# Patient Record
Sex: FEMALE | Race: WHITE | Employment: OTHER | URBAN - METROPOLITAN AREA
[De-identification: names, ages, dates, MRNs, and addresses within clinical notes are randomized per-mention and may not be internally consistent; named-entity substitution may affect disease eponyms.]

---

## 2017-04-23 PROBLEM — S22.42XA CLOSED FRACTURE OF MULTIPLE RIBS OF LEFT SIDE: Status: ACTIVE | Noted: 2017-04-23

## 2017-04-23 PROBLEM — S22.080A CLOSED WEDGE COMPRESSION FRACTURE OF TWELFTH THORACIC VERTEBRA (HCC): Status: ACTIVE | Noted: 2017-04-23

## 2017-04-23 PROBLEM — S27.0XXA TRAUMATIC PNEUMOTHORAX: Status: ACTIVE | Noted: 2017-04-23

## 2017-04-25 PROBLEM — S06.0X0A CONCUSSION WITHOUT LOSS OF CONSCIOUSNESS: Status: ACTIVE | Noted: 2017-04-25

## 2019-04-03 RX ORDER — SODIUM CHLORIDE 0.9 % (FLUSH) 0.9 %
10 SYRINGE (ML) INJECTION PRN
Status: CANCELLED | OUTPATIENT
Start: 2019-04-04

## 2019-04-03 RX ORDER — HEPARIN SODIUM (PORCINE) LOCK FLUSH IV SOLN 100 UNIT/ML 100 UNIT/ML
500 SOLUTION INTRAVENOUS PRN
Status: CANCELLED | OUTPATIENT
Start: 2019-04-04

## 2021-01-18 RX ORDER — HEPARIN SODIUM (PORCINE) LOCK FLUSH IV SOLN 100 UNIT/ML 100 UNIT/ML
500 SOLUTION INTRAVENOUS PRN
Status: CANCELLED | OUTPATIENT
Start: 2021-01-25

## 2021-01-18 RX ORDER — SODIUM CHLORIDE 9 MG/ML
INJECTION, SOLUTION INTRAVENOUS CONTINUOUS
Status: CANCELLED | OUTPATIENT
Start: 2021-01-25

## 2021-01-18 RX ORDER — ACETAMINOPHEN 325 MG/1
650 TABLET ORAL ONCE
Status: CANCELLED | OUTPATIENT
Start: 2021-01-25

## 2021-01-18 RX ORDER — DIPHENHYDRAMINE HYDROCHLORIDE 50 MG/ML
50 INJECTION INTRAMUSCULAR; INTRAVENOUS ONCE
Status: CANCELLED | OUTPATIENT
Start: 2021-01-25

## 2021-01-18 RX ORDER — SODIUM CHLORIDE 0.9 % (FLUSH) 0.9 %
10 SYRINGE (ML) INJECTION PRN
Status: CANCELLED | OUTPATIENT
Start: 2021-01-25

## 2021-01-25 ENCOUNTER — TELEPHONE (OUTPATIENT)
Dept: PHARMACY | Age: 61
End: 2021-01-25

## 2021-02-12 ENCOUNTER — HOSPITAL ENCOUNTER (OUTPATIENT)
Dept: INFUSION THERAPY | Age: 61
Setting detail: INFUSION SERIES
Discharge: HOME | End: 2021-02-12
Payer: MEDICARE

## 2021-02-12 VITALS
RESPIRATION RATE: 16 BRPM | HEART RATE: 78 BPM | OXYGEN SATURATION: 97 % | TEMPERATURE: 98 F | DIASTOLIC BLOOD PRESSURE: 96 MMHG | SYSTOLIC BLOOD PRESSURE: 167 MMHG

## 2021-02-12 DIAGNOSIS — G35 MULTIPLE SCLEROSIS (HCC): Primary | ICD-10-CM

## 2021-02-12 PROCEDURE — 2580000003 HC RX 258: Performed by: PSYCHIATRY & NEUROLOGY

## 2021-02-12 PROCEDURE — 6360000002 HC RX W HCPCS: Performed by: PSYCHIATRY & NEUROLOGY

## 2021-02-12 PROCEDURE — 96375 TX/PRO/DX INJ NEW DRUG ADDON: CPT

## 2021-02-12 PROCEDURE — 96365 THER/PROPH/DIAG IV INF INIT: CPT

## 2021-02-12 PROCEDURE — 6370000000 HC RX 637 (ALT 250 FOR IP): Performed by: PSYCHIATRY & NEUROLOGY

## 2021-02-12 PROCEDURE — 96361 HYDRATE IV INFUSION ADD-ON: CPT

## 2021-02-12 PROCEDURE — 96366 THER/PROPH/DIAG IV INF ADDON: CPT

## 2021-02-12 RX ORDER — SODIUM CHLORIDE 9 MG/ML
INJECTION, SOLUTION INTRAVENOUS CONTINUOUS
Status: CANCELLED | OUTPATIENT
Start: 2021-02-26

## 2021-02-12 RX ORDER — SODIUM CHLORIDE 9 MG/ML
INJECTION, SOLUTION INTRAVENOUS CONTINUOUS
Status: ACTIVE | OUTPATIENT
Start: 2021-02-12 | End: 2021-02-12

## 2021-02-12 RX ORDER — DIPHENHYDRAMINE HYDROCHLORIDE 50 MG/ML
50 INJECTION INTRAMUSCULAR; INTRAVENOUS ONCE
Status: COMPLETED | OUTPATIENT
Start: 2021-02-12 | End: 2021-02-12

## 2021-02-12 RX ORDER — SODIUM CHLORIDE 0.9 % (FLUSH) 0.9 %
10 SYRINGE (ML) INJECTION PRN
Status: CANCELLED | OUTPATIENT
Start: 2021-02-26

## 2021-02-12 RX ORDER — DIPHENHYDRAMINE HYDROCHLORIDE 50 MG/ML
50 INJECTION INTRAMUSCULAR; INTRAVENOUS ONCE
Status: CANCELLED | OUTPATIENT
Start: 2021-02-26

## 2021-02-12 RX ORDER — SODIUM CHLORIDE 0.9 % (FLUSH) 0.9 %
10 SYRINGE (ML) INJECTION PRN
Status: DISCONTINUED | OUTPATIENT
Start: 2021-02-12 | End: 2021-02-13 | Stop reason: HOSPADM

## 2021-02-12 RX ORDER — ACETAMINOPHEN 325 MG/1
650 TABLET ORAL ONCE
Status: COMPLETED | OUTPATIENT
Start: 2021-02-12 | End: 2021-02-12

## 2021-02-12 RX ORDER — ACETAMINOPHEN 325 MG/1
650 TABLET ORAL ONCE
Status: CANCELLED | OUTPATIENT
Start: 2021-02-26

## 2021-02-12 RX ORDER — HEPARIN SODIUM (PORCINE) LOCK FLUSH IV SOLN 100 UNIT/ML 100 UNIT/ML
500 SOLUTION INTRAVENOUS PRN
Status: CANCELLED | OUTPATIENT
Start: 2021-02-26

## 2021-02-12 RX ADMIN — HYDROCORTISONE SODIUM SUCCINATE 100 MG: 100 INJECTION, POWDER, FOR SOLUTION INTRAMUSCULAR; INTRAVENOUS at 09:41

## 2021-02-12 RX ADMIN — Medication 10 ML: at 09:24

## 2021-02-12 RX ADMIN — Medication 10 ML: at 09:46

## 2021-02-12 RX ADMIN — SODIUM CHLORIDE: 9 INJECTION, SOLUTION INTRAVENOUS at 09:25

## 2021-02-12 RX ADMIN — Medication 10 ML: at 09:42

## 2021-02-12 RX ADMIN — ACETAMINOPHEN 650 MG: 325 TABLET, FILM COATED ORAL at 09:39

## 2021-02-12 RX ADMIN — Medication 10 ML: at 14:00

## 2021-02-12 RX ADMIN — DIPHENHYDRAMINE HYDROCHLORIDE 50 MG: 50 INJECTION, SOLUTION INTRAMUSCULAR; INTRAVENOUS at 09:42

## 2021-02-12 RX ADMIN — Medication 10 ML: at 09:41

## 2021-02-12 ASSESSMENT — PAIN SCALES - GENERAL: PAINLEVEL_OUTOF10: 0

## 2021-02-26 ENCOUNTER — HOSPITAL ENCOUNTER (OUTPATIENT)
Dept: INFUSION THERAPY | Age: 61
Setting detail: INFUSION SERIES
Discharge: HOME | End: 2021-02-26
Payer: MEDICARE

## 2021-02-26 VITALS
RESPIRATION RATE: 16 BRPM | SYSTOLIC BLOOD PRESSURE: 155 MMHG | TEMPERATURE: 98 F | DIASTOLIC BLOOD PRESSURE: 70 MMHG | HEART RATE: 78 BPM | OXYGEN SATURATION: 97 %

## 2021-02-26 DIAGNOSIS — G35 MULTIPLE SCLEROSIS (HCC): Primary | ICD-10-CM

## 2021-02-26 PROCEDURE — 96361 HYDRATE IV INFUSION ADD-ON: CPT

## 2021-02-26 PROCEDURE — 6360000002 HC RX W HCPCS: Performed by: PSYCHIATRY & NEUROLOGY

## 2021-02-26 PROCEDURE — 6370000000 HC RX 637 (ALT 250 FOR IP): Performed by: PSYCHIATRY & NEUROLOGY

## 2021-02-26 PROCEDURE — 96366 THER/PROPH/DIAG IV INF ADDON: CPT

## 2021-02-26 PROCEDURE — 96365 THER/PROPH/DIAG IV INF INIT: CPT

## 2021-02-26 PROCEDURE — 96375 TX/PRO/DX INJ NEW DRUG ADDON: CPT

## 2021-02-26 PROCEDURE — 2580000003 HC RX 258: Performed by: PSYCHIATRY & NEUROLOGY

## 2021-02-26 RX ORDER — HEPARIN SODIUM (PORCINE) LOCK FLUSH IV SOLN 100 UNIT/ML 100 UNIT/ML
500 SOLUTION INTRAVENOUS PRN
Status: CANCELLED | OUTPATIENT
Start: 2021-02-26

## 2021-02-26 RX ORDER — ACETAMINOPHEN 325 MG/1
650 TABLET ORAL ONCE
Status: CANCELLED | OUTPATIENT
Start: 2021-02-26

## 2021-02-26 RX ORDER — DIPHENHYDRAMINE HYDROCHLORIDE 50 MG/ML
50 INJECTION INTRAMUSCULAR; INTRAVENOUS ONCE
Status: COMPLETED | OUTPATIENT
Start: 2021-02-26 | End: 2021-02-26

## 2021-02-26 RX ORDER — ACETAMINOPHEN 325 MG/1
650 TABLET ORAL ONCE
Status: COMPLETED | OUTPATIENT
Start: 2021-02-26 | End: 2021-02-26

## 2021-02-26 RX ORDER — LOSARTAN POTASSIUM 50 MG/1
50 TABLET ORAL DAILY
COMMUNITY

## 2021-02-26 RX ORDER — DIPHENHYDRAMINE HYDROCHLORIDE 50 MG/ML
50 INJECTION INTRAMUSCULAR; INTRAVENOUS ONCE
Status: CANCELLED | OUTPATIENT
Start: 2021-02-26

## 2021-02-26 RX ORDER — SODIUM CHLORIDE 9 MG/ML
INJECTION, SOLUTION INTRAVENOUS CONTINUOUS
Status: ACTIVE | OUTPATIENT
Start: 2021-02-26 | End: 2021-02-26

## 2021-02-26 RX ORDER — LEVOTHYROXINE SODIUM 0.05 MG/1
50 TABLET ORAL DAILY
COMMUNITY

## 2021-02-26 RX ORDER — SODIUM CHLORIDE 0.9 % (FLUSH) 0.9 %
10 SYRINGE (ML) INJECTION PRN
Status: CANCELLED | OUTPATIENT
Start: 2021-02-26

## 2021-02-26 RX ORDER — BACLOFEN 10 MG/1
10 TABLET ORAL 3 TIMES DAILY
COMMUNITY

## 2021-02-26 RX ORDER — SODIUM CHLORIDE 9 MG/ML
INJECTION, SOLUTION INTRAVENOUS CONTINUOUS
Status: CANCELLED | OUTPATIENT
Start: 2021-02-26

## 2021-02-26 RX ORDER — MULTIVITAMIN WITH IRON
100 TABLET ORAL DAILY
COMMUNITY

## 2021-02-26 RX ORDER — SODIUM CHLORIDE 0.9 % (FLUSH) 0.9 %
10 SYRINGE (ML) INJECTION PRN
Status: DISCONTINUED | OUTPATIENT
Start: 2021-02-26 | End: 2021-02-27 | Stop reason: HOSPADM

## 2021-02-26 RX ADMIN — HYDROCORTISONE SODIUM SUCCINATE 100 MG: 100 INJECTION, POWDER, FOR SOLUTION INTRAMUSCULAR; INTRAVENOUS at 09:09

## 2021-02-26 RX ADMIN — SODIUM CHLORIDE, PRESERVATIVE FREE 10 ML: 5 INJECTION INTRAVENOUS at 09:09

## 2021-02-26 RX ADMIN — SODIUM CHLORIDE: 9 INJECTION, SOLUTION INTRAVENOUS at 09:06

## 2021-02-26 RX ADMIN — ACETAMINOPHEN 650 MG: 325 TABLET, FILM COATED ORAL at 09:07

## 2021-02-26 RX ADMIN — SODIUM CHLORIDE, PRESERVATIVE FREE 10 ML: 5 INJECTION INTRAVENOUS at 09:10

## 2021-02-26 RX ADMIN — SODIUM CHLORIDE, PRESERVATIVE FREE 10 ML: 5 INJECTION INTRAVENOUS at 12:33

## 2021-02-26 RX ADMIN — SODIUM CHLORIDE, PRESERVATIVE FREE 10 ML: 5 INJECTION INTRAVENOUS at 09:06

## 2021-02-26 RX ADMIN — DIPHENHYDRAMINE HYDROCHLORIDE 50 MG: 50 INJECTION, SOLUTION INTRAMUSCULAR; INTRAVENOUS at 09:08

## 2021-02-26 ASSESSMENT — PAIN SCALES - GENERAL: PAINLEVEL_OUTOF10: 0

## 2021-08-02 ENCOUNTER — TELEPHONE (OUTPATIENT)
Dept: INFUSION THERAPY | Age: 61
End: 2021-08-02

## 2021-08-04 RX ORDER — EPINEPHRINE 1 MG/ML
0.3 INJECTION, SOLUTION, CONCENTRATE INTRAVENOUS PRN
Status: CANCELLED | OUTPATIENT
Start: 2021-08-05

## 2021-08-04 RX ORDER — SODIUM CHLORIDE 9 MG/ML
INJECTION, SOLUTION INTRAVENOUS CONTINUOUS
Status: CANCELLED | OUTPATIENT
Start: 2021-08-05

## 2021-08-04 RX ORDER — SODIUM CHLORIDE 0.9 % (FLUSH) 0.9 %
5-40 SYRINGE (ML) INJECTION PRN
Status: CANCELLED | OUTPATIENT
Start: 2021-08-05

## 2021-08-04 RX ORDER — ACETAMINOPHEN 325 MG/1
650 TABLET ORAL ONCE
Status: CANCELLED | OUTPATIENT
Start: 2021-08-05

## 2021-08-04 RX ORDER — METHYLPREDNISOLONE SODIUM SUCCINATE 125 MG/2ML
100 INJECTION, POWDER, LYOPHILIZED, FOR SOLUTION INTRAMUSCULAR; INTRAVENOUS ONCE
Status: CANCELLED | OUTPATIENT
Start: 2021-08-05

## 2021-08-04 RX ORDER — METHYLPREDNISOLONE SODIUM SUCCINATE 125 MG/2ML
125 INJECTION, POWDER, LYOPHILIZED, FOR SOLUTION INTRAMUSCULAR; INTRAVENOUS ONCE
Status: CANCELLED | OUTPATIENT
Start: 2021-08-05 | End: 2021-08-05

## 2021-08-04 RX ORDER — DIPHENHYDRAMINE HYDROCHLORIDE 50 MG/ML
50 INJECTION INTRAMUSCULAR; INTRAVENOUS ONCE
Status: CANCELLED | OUTPATIENT
Start: 2021-08-05 | End: 2021-08-05

## 2021-08-04 RX ORDER — HEPARIN SODIUM (PORCINE) LOCK FLUSH IV SOLN 100 UNIT/ML 100 UNIT/ML
500 SOLUTION INTRAVENOUS PRN
Status: CANCELLED | OUTPATIENT
Start: 2021-08-05

## 2021-08-26 ENCOUNTER — HOSPITAL ENCOUNTER (OUTPATIENT)
Dept: INFUSION THERAPY | Age: 61
Setting detail: INFUSION SERIES
Discharge: HOME | End: 2021-08-26
Payer: MEDICARE

## 2021-08-26 VITALS
SYSTOLIC BLOOD PRESSURE: 160 MMHG | DIASTOLIC BLOOD PRESSURE: 67 MMHG | OXYGEN SATURATION: 98 % | TEMPERATURE: 97.4 F | HEART RATE: 62 BPM | RESPIRATION RATE: 22 BRPM

## 2021-08-26 DIAGNOSIS — G35 MULTIPLE SCLEROSIS (HCC): Primary | ICD-10-CM

## 2021-08-26 PROCEDURE — 96365 THER/PROPH/DIAG IV INF INIT: CPT

## 2021-08-26 PROCEDURE — 96375 TX/PRO/DX INJ NEW DRUG ADDON: CPT

## 2021-08-26 PROCEDURE — 6370000000 HC RX 637 (ALT 250 FOR IP): Performed by: PSYCHIATRY & NEUROLOGY

## 2021-08-26 PROCEDURE — 2580000003 HC RX 258: Performed by: PSYCHIATRY & NEUROLOGY

## 2021-08-26 PROCEDURE — 96366 THER/PROPH/DIAG IV INF ADDON: CPT

## 2021-08-26 PROCEDURE — 96361 HYDRATE IV INFUSION ADD-ON: CPT

## 2021-08-26 PROCEDURE — 96367 TX/PROPH/DG ADDL SEQ IV INF: CPT

## 2021-08-26 PROCEDURE — 6360000002 HC RX W HCPCS: Performed by: PSYCHIATRY & NEUROLOGY

## 2021-08-26 RX ORDER — METHYLPREDNISOLONE SODIUM SUCCINATE 125 MG/2ML
100 INJECTION, POWDER, LYOPHILIZED, FOR SOLUTION INTRAMUSCULAR; INTRAVENOUS ONCE
Status: COMPLETED | OUTPATIENT
Start: 2021-08-26 | End: 2021-08-26

## 2021-08-26 RX ORDER — SODIUM CHLORIDE 9 MG/ML
INJECTION, SOLUTION INTRAVENOUS CONTINUOUS
Status: CANCELLED | OUTPATIENT
Start: 2022-02-10

## 2021-08-26 RX ORDER — DIPHENHYDRAMINE HYDROCHLORIDE 50 MG/ML
50 INJECTION INTRAMUSCULAR; INTRAVENOUS ONCE
Status: CANCELLED | OUTPATIENT
Start: 2022-02-10 | End: 2022-02-10

## 2021-08-26 RX ORDER — SODIUM CHLORIDE 0.9 % (FLUSH) 0.9 %
5-40 SYRINGE (ML) INJECTION PRN
Status: CANCELLED | OUTPATIENT
Start: 2022-02-10

## 2021-08-26 RX ORDER — ACETAMINOPHEN 325 MG/1
650 TABLET ORAL ONCE
Status: COMPLETED | OUTPATIENT
Start: 2021-08-26 | End: 2021-08-26

## 2021-08-26 RX ORDER — ACETAMINOPHEN 325 MG/1
650 TABLET ORAL ONCE
Status: CANCELLED | OUTPATIENT
Start: 2022-02-10

## 2021-08-26 RX ORDER — SODIUM CHLORIDE 9 MG/ML
INJECTION, SOLUTION INTRAVENOUS CONTINUOUS
Status: ACTIVE | OUTPATIENT
Start: 2021-08-26 | End: 2021-08-26

## 2021-08-26 RX ORDER — HEPARIN SODIUM (PORCINE) LOCK FLUSH IV SOLN 100 UNIT/ML 100 UNIT/ML
500 SOLUTION INTRAVENOUS PRN
Status: CANCELLED | OUTPATIENT
Start: 2022-02-10

## 2021-08-26 RX ORDER — SODIUM CHLORIDE 0.9 % (FLUSH) 0.9 %
5-40 SYRINGE (ML) INJECTION PRN
Status: DISCONTINUED | OUTPATIENT
Start: 2021-08-26 | End: 2021-08-27 | Stop reason: HOSPADM

## 2021-08-26 RX ORDER — METHYLPREDNISOLONE SODIUM SUCCINATE 125 MG/2ML
100 INJECTION, POWDER, LYOPHILIZED, FOR SOLUTION INTRAMUSCULAR; INTRAVENOUS ONCE
Status: CANCELLED | OUTPATIENT
Start: 2022-02-10

## 2021-08-26 RX ORDER — EPINEPHRINE 1 MG/ML
0.3 INJECTION, SOLUTION, CONCENTRATE INTRAVENOUS PRN
Status: CANCELLED | OUTPATIENT
Start: 2022-02-10

## 2021-08-26 RX ORDER — METHYLPREDNISOLONE SODIUM SUCCINATE 125 MG/2ML
125 INJECTION, POWDER, LYOPHILIZED, FOR SOLUTION INTRAMUSCULAR; INTRAVENOUS ONCE
Status: CANCELLED | OUTPATIENT
Start: 2022-02-10 | End: 2022-02-10

## 2021-08-26 RX ADMIN — METHYLPREDNISOLONE SODIUM SUCCINATE 100 MG: 125 INJECTION, POWDER, FOR SOLUTION INTRAMUSCULAR; INTRAVENOUS at 09:22

## 2021-08-26 RX ADMIN — DIPHENHYDRAMINE HYDROCHLORIDE 50 MG: 50 INJECTION, SOLUTION INTRAMUSCULAR; INTRAVENOUS at 09:27

## 2021-08-26 RX ADMIN — SODIUM CHLORIDE: 9 INJECTION, SOLUTION INTRAVENOUS at 09:24

## 2021-08-26 RX ADMIN — ACETAMINOPHEN 650 MG: 325 TABLET ORAL at 09:22

## 2021-08-26 ASSESSMENT — PAIN SCALES - GENERAL: PAINLEVEL_OUTOF10: 0

## 2022-01-20 RX ORDER — SODIUM CHLORIDE 0.9 % (FLUSH) 0.9 %
5-40 SYRINGE (ML) INJECTION PRN
Status: CANCELLED | OUTPATIENT
Start: 2022-01-21

## 2022-01-20 RX ORDER — HEPARIN SODIUM (PORCINE) LOCK FLUSH IV SOLN 100 UNIT/ML 100 UNIT/ML
500 SOLUTION INTRAVENOUS PRN
Status: CANCELLED | OUTPATIENT
Start: 2022-01-21

## 2022-01-20 RX ORDER — METHYLPREDNISOLONE SODIUM SUCCINATE 125 MG/2ML
100 INJECTION, POWDER, LYOPHILIZED, FOR SOLUTION INTRAMUSCULAR; INTRAVENOUS ONCE
Status: CANCELLED | OUTPATIENT
Start: 2022-01-21

## 2022-01-20 RX ORDER — SODIUM CHLORIDE 9 MG/ML
25 INJECTION, SOLUTION INTRAVENOUS PRN
Status: CANCELLED | OUTPATIENT
Start: 2022-01-21

## 2022-01-20 RX ORDER — ACETAMINOPHEN 325 MG/1
650 TABLET ORAL ONCE
Status: CANCELLED | OUTPATIENT
Start: 2022-01-21

## 2022-01-20 RX ORDER — EPINEPHRINE 1 MG/ML
0.3 INJECTION, SOLUTION, CONCENTRATE INTRAVENOUS PRN
Status: CANCELLED | OUTPATIENT
Start: 2022-01-21

## 2022-01-20 RX ORDER — SODIUM CHLORIDE 9 MG/ML
INJECTION, SOLUTION INTRAVENOUS CONTINUOUS
Status: CANCELLED | OUTPATIENT
Start: 2022-01-21

## 2022-01-20 RX ORDER — ALBUTEROL SULFATE 90 UG/1
4 AEROSOL, METERED RESPIRATORY (INHALATION) PRN
Status: CANCELLED | OUTPATIENT
Start: 2022-01-21

## 2022-01-20 RX ORDER — ONDANSETRON 2 MG/ML
8 INJECTION INTRAMUSCULAR; INTRAVENOUS
Status: CANCELLED | OUTPATIENT
Start: 2022-01-21

## 2022-01-20 RX ORDER — DIPHENHYDRAMINE HYDROCHLORIDE 50 MG/ML
50 INJECTION INTRAMUSCULAR; INTRAVENOUS ONCE
Status: CANCELLED | OUTPATIENT
Start: 2022-01-21

## 2022-01-20 RX ORDER — DIPHENHYDRAMINE HYDROCHLORIDE 50 MG/ML
50 INJECTION INTRAMUSCULAR; INTRAVENOUS
Status: CANCELLED | OUTPATIENT
Start: 2022-01-21

## 2022-01-20 RX ORDER — ACETAMINOPHEN 325 MG/1
650 TABLET ORAL
Status: CANCELLED | OUTPATIENT
Start: 2022-01-21

## 2022-02-10 ENCOUNTER — TELEPHONE (OUTPATIENT)
Dept: INFUSION THERAPY | Age: 62
End: 2022-02-10

## 2022-02-10 NOTE — TELEPHONE ENCOUNTER
Spoke to Stalin from [employer] who states that patients ocrevus will be delivered on 2/22/2022 for 2/28/2022 DOS

## 2022-02-28 ENCOUNTER — HOSPITAL ENCOUNTER (OUTPATIENT)
Dept: INFUSION THERAPY | Age: 62
Setting detail: INFUSION SERIES
Discharge: HOME | End: 2022-02-28
Payer: MEDICARE

## 2022-02-28 VITALS
WEIGHT: 85 LBS | RESPIRATION RATE: 22 BRPM | OXYGEN SATURATION: 98 % | TEMPERATURE: 98 F | HEIGHT: 59 IN | DIASTOLIC BLOOD PRESSURE: 87 MMHG | SYSTOLIC BLOOD PRESSURE: 113 MMHG | HEART RATE: 60 BPM | BODY MASS INDEX: 17.14 KG/M2

## 2022-02-28 DIAGNOSIS — G35 MULTIPLE SCLEROSIS (HCC): Primary | ICD-10-CM

## 2022-02-28 PROCEDURE — 6360000002 HC RX W HCPCS: Performed by: PSYCHIATRY & NEUROLOGY

## 2022-02-28 PROCEDURE — 96361 HYDRATE IV INFUSION ADD-ON: CPT

## 2022-02-28 PROCEDURE — 96365 THER/PROPH/DIAG IV INF INIT: CPT

## 2022-02-28 PROCEDURE — 6370000000 HC RX 637 (ALT 250 FOR IP): Performed by: PSYCHIATRY & NEUROLOGY

## 2022-02-28 PROCEDURE — 96375 TX/PRO/DX INJ NEW DRUG ADDON: CPT

## 2022-02-28 PROCEDURE — 2580000003 HC RX 258: Performed by: PSYCHIATRY & NEUROLOGY

## 2022-02-28 PROCEDURE — 96366 THER/PROPH/DIAG IV INF ADDON: CPT

## 2022-02-28 RX ORDER — METHYLPREDNISOLONE SODIUM SUCCINATE 125 MG/2ML
100 INJECTION, POWDER, LYOPHILIZED, FOR SOLUTION INTRAMUSCULAR; INTRAVENOUS ONCE
Status: COMPLETED | OUTPATIENT
Start: 2022-02-28 | End: 2022-02-28

## 2022-02-28 RX ORDER — ONDANSETRON 2 MG/ML
8 INJECTION INTRAMUSCULAR; INTRAVENOUS
OUTPATIENT
Start: 2022-08-15

## 2022-02-28 RX ORDER — DIPHENHYDRAMINE HYDROCHLORIDE 50 MG/ML
50 INJECTION INTRAMUSCULAR; INTRAVENOUS ONCE
OUTPATIENT
Start: 2022-08-15

## 2022-02-28 RX ORDER — SODIUM CHLORIDE 0.9 % (FLUSH) 0.9 %
5-40 SYRINGE (ML) INJECTION PRN
OUTPATIENT
Start: 2022-08-15

## 2022-02-28 RX ORDER — HEPARIN SODIUM (PORCINE) LOCK FLUSH IV SOLN 100 UNIT/ML 100 UNIT/ML
500 SOLUTION INTRAVENOUS PRN
OUTPATIENT
Start: 2022-08-15

## 2022-02-28 RX ORDER — SODIUM CHLORIDE 0.9 % (FLUSH) 0.9 %
5-40 SYRINGE (ML) INJECTION PRN
Status: DISCONTINUED | OUTPATIENT
Start: 2022-02-28 | End: 2022-03-01 | Stop reason: HOSPADM

## 2022-02-28 RX ORDER — ACETAMINOPHEN 325 MG/1
650 TABLET ORAL
OUTPATIENT
Start: 2022-08-15

## 2022-02-28 RX ORDER — DIPHENHYDRAMINE HYDROCHLORIDE 50 MG/ML
50 INJECTION INTRAMUSCULAR; INTRAVENOUS
OUTPATIENT
Start: 2022-08-15

## 2022-02-28 RX ORDER — EPINEPHRINE 1 MG/ML
0.3 INJECTION, SOLUTION, CONCENTRATE INTRAVENOUS PRN
OUTPATIENT
Start: 2022-08-15

## 2022-02-28 RX ORDER — SODIUM CHLORIDE 9 MG/ML
INJECTION, SOLUTION INTRAVENOUS CONTINUOUS
Status: ACTIVE | OUTPATIENT
Start: 2022-02-28 | End: 2022-02-28

## 2022-02-28 RX ORDER — METHYLPREDNISOLONE SODIUM SUCCINATE 125 MG/2ML
100 INJECTION, POWDER, LYOPHILIZED, FOR SOLUTION INTRAMUSCULAR; INTRAVENOUS ONCE
OUTPATIENT
Start: 2022-08-15

## 2022-02-28 RX ORDER — DIPHENHYDRAMINE HYDROCHLORIDE 50 MG/ML
50 INJECTION INTRAMUSCULAR; INTRAVENOUS ONCE
Status: COMPLETED | OUTPATIENT
Start: 2022-02-28 | End: 2022-02-28

## 2022-02-28 RX ORDER — ACETAMINOPHEN 325 MG/1
650 TABLET ORAL ONCE
Status: COMPLETED | OUTPATIENT
Start: 2022-02-28 | End: 2022-02-28

## 2022-02-28 RX ORDER — SODIUM CHLORIDE 9 MG/ML
INJECTION, SOLUTION INTRAVENOUS CONTINUOUS
OUTPATIENT
Start: 2022-08-15

## 2022-02-28 RX ORDER — ALBUTEROL SULFATE 90 UG/1
4 AEROSOL, METERED RESPIRATORY (INHALATION) PRN
OUTPATIENT
Start: 2022-08-15

## 2022-02-28 RX ORDER — ACETAMINOPHEN 325 MG/1
650 TABLET ORAL ONCE
OUTPATIENT
Start: 2022-08-15

## 2022-02-28 RX ORDER — SODIUM CHLORIDE 9 MG/ML
25 INJECTION, SOLUTION INTRAVENOUS PRN
OUTPATIENT
Start: 2022-08-15

## 2022-02-28 RX ADMIN — Medication 10 ML: at 09:24

## 2022-02-28 RX ADMIN — Medication 10 ML: at 09:20

## 2022-02-28 RX ADMIN — Medication 10 ML: at 09:23

## 2022-02-28 RX ADMIN — METHYLPREDNISOLONE SODIUM SUCCINATE 100 MG: 125 INJECTION, POWDER, FOR SOLUTION INTRAMUSCULAR; INTRAVENOUS at 09:23

## 2022-02-28 RX ADMIN — SODIUM CHLORIDE: 9 INJECTION, SOLUTION INTRAVENOUS at 09:27

## 2022-02-28 RX ADMIN — DIPHENHYDRAMINE HYDROCHLORIDE 50 MG: 50 INJECTION INTRAMUSCULAR; INTRAVENOUS at 09:19

## 2022-02-28 RX ADMIN — Medication 10 ML: at 09:07

## 2022-02-28 RX ADMIN — ACETAMINOPHEN 650 MG: 325 TABLET ORAL at 09:19

## 2022-02-28 ASSESSMENT — PAIN SCALES - GENERAL: PAINLEVEL_OUTOF10: 0

## 2022-08-16 ENCOUNTER — TELEPHONE (OUTPATIENT)
Dept: INFUSION THERAPY | Age: 62
End: 2022-08-16

## 2022-08-16 NOTE — TELEPHONE ENCOUNTER
Spoke to Lucinda from [facility] who states that patient is out of refills. Called Dr Ty Obrien office and they state they will fax a new order to [facility] today.

## 2022-08-19 ENCOUNTER — TELEPHONE (OUTPATIENT)
Dept: INFUSION THERAPY | Age: 62
End: 2022-08-19

## 2022-08-22 ENCOUNTER — TELEPHONE (OUTPATIENT)
Dept: INFUSION THERAPY | Age: 62
End: 2022-08-22

## 2022-08-22 NOTE — TELEPHONE ENCOUNTER
Spoke to Angel dooley from [email or web address] who states that they have not received the new order yet so medication can not be delivered. Faxed order again. Will follow up.

## 2022-08-22 NOTE — TELEPHONE ENCOUNTER
Spoke to Mateusz from [address] [place] [address] Ref number Anotnina Luna states that patient has $0 deductible and has $4500 with only $325 met to date.

## 2022-08-23 ENCOUNTER — TELEPHONE (OUTPATIENT)
Dept: INFUSION THERAPY | Age: 62
End: 2022-08-23

## 2022-08-23 NOTE — TELEPHONE ENCOUNTER
Spoke to Debbie at [facility] who states they still do not have the order. Spoke to Mary Jordan at Dr office who states they have sent if over multiple times and have gotten fax confirmations. Mary Jordan states she will send again. Will try to order on Thursday.

## 2022-08-29 ENCOUNTER — HOSPITAL ENCOUNTER (OUTPATIENT)
Dept: INFUSION THERAPY | Age: 62
Setting detail: INFUSION SERIES
Discharge: HOME | End: 2022-08-29

## 2023-02-16 ENCOUNTER — HOSPITAL ENCOUNTER (INPATIENT)
Age: 63
LOS: 5 days | Discharge: NURSING, REHAB OR LONG-TERM CARE | DRG: 480 | End: 2023-02-21
Attending: EMERGENCY MEDICINE | Admitting: FAMILY MEDICINE
Payer: MEDICARE

## 2023-02-16 ENCOUNTER — APPOINTMENT (OUTPATIENT)
Dept: GENERAL RADIOLOGY | Age: 63
DRG: 480 | End: 2023-02-16
Payer: MEDICARE

## 2023-02-16 ENCOUNTER — APPOINTMENT (OUTPATIENT)
Dept: CT IMAGING | Age: 63
DRG: 480 | End: 2023-02-16
Payer: MEDICARE

## 2023-02-16 DIAGNOSIS — S72.141A: ICD-10-CM

## 2023-02-16 DIAGNOSIS — S72.144A CLOSED NONDISPLACED INTERTROCHANTERIC FRACTURE OF RIGHT FEMUR, INITIAL ENCOUNTER (HCC): Primary | ICD-10-CM

## 2023-02-16 PROBLEM — S72.8X1A OTHER FRACTURE OF RIGHT FEMUR, INITIAL ENCOUNTER FOR CLOSED FRACTURE (HCC): Status: ACTIVE | Noted: 2023-02-16

## 2023-02-16 LAB
ALBUMIN SERPL-MCNC: 3.8 G/DL (ref 3.5–5.2)
ALP BLD-CCNC: 101 U/L (ref 35–104)
ALT SERPL-CCNC: 12 U/L (ref 0–32)
ANION GAP SERPL CALCULATED.3IONS-SCNC: 10 MMOL/L (ref 7–16)
AST SERPL-CCNC: 19 U/L (ref 0–31)
BASOPHILS ABSOLUTE: 0.03 E9/L (ref 0–0.2)
BASOPHILS RELATIVE PERCENT: 0.4 % (ref 0–2)
BILIRUB SERPL-MCNC: 0.3 MG/DL (ref 0–1.2)
BUN BLDV-MCNC: 11 MG/DL (ref 6–23)
CALCIUM SERPL-MCNC: 8.8 MG/DL (ref 8.6–10.2)
CHLORIDE BLD-SCNC: 107 MMOL/L (ref 98–107)
CO2: 24 MMOL/L (ref 22–29)
CREAT SERPL-MCNC: 0.7 MG/DL (ref 0.5–1)
EOSINOPHILS ABSOLUTE: 0.04 E9/L (ref 0.05–0.5)
EOSINOPHILS RELATIVE PERCENT: 0.5 % (ref 0–6)
GFR SERPL CREATININE-BSD FRML MDRD: >60 ML/MIN/1.73
GLUCOSE BLD-MCNC: 75 MG/DL (ref 74–99)
HCT VFR BLD CALC: 28.3 % (ref 34–48)
HEMOGLOBIN: 9.3 G/DL (ref 11.5–15.5)
IMMATURE GRANULOCYTES #: 0.04 E9/L
IMMATURE GRANULOCYTES %: 0.5 % (ref 0–5)
LYMPHOCYTES ABSOLUTE: 0.86 E9/L (ref 1.5–4)
LYMPHOCYTES RELATIVE PERCENT: 10.3 % (ref 20–42)
MCH RBC QN AUTO: 31.4 PG (ref 26–35)
MCHC RBC AUTO-ENTMCNC: 32.9 % (ref 32–34.5)
MCV RBC AUTO: 95.6 FL (ref 80–99.9)
MONOCYTES ABSOLUTE: 0.8 E9/L (ref 0.1–0.95)
MONOCYTES RELATIVE PERCENT: 9.6 % (ref 2–12)
NEUTROPHILS ABSOLUTE: 6.54 E9/L (ref 1.8–7.3)
NEUTROPHILS RELATIVE PERCENT: 78.7 % (ref 43–80)
PDW BLD-RTO: 12.8 FL (ref 11.5–15)
PLATELET # BLD: 169 E9/L (ref 130–450)
PMV BLD AUTO: 9.7 FL (ref 7–12)
POTASSIUM REFLEX MAGNESIUM: 4.2 MMOL/L (ref 3.5–5)
RBC # BLD: 2.96 E12/L (ref 3.5–5.5)
REASON FOR REJECTION: NORMAL
REJECTED TEST: NORMAL
SODIUM BLD-SCNC: 141 MMOL/L (ref 132–146)
TOTAL PROTEIN: 6.7 G/DL (ref 6.4–8.3)
WBC # BLD: 8.3 E9/L (ref 4.5–11.5)

## 2023-02-16 PROCEDURE — 71045 X-RAY EXAM CHEST 1 VIEW: CPT

## 2023-02-16 PROCEDURE — 96375 TX/PRO/DX INJ NEW DRUG ADDON: CPT

## 2023-02-16 PROCEDURE — 36415 COLL VENOUS BLD VENIPUNCTURE: CPT

## 2023-02-16 PROCEDURE — 73700 CT LOWER EXTREMITY W/O DYE: CPT

## 2023-02-16 PROCEDURE — 85025 COMPLETE CBC W/AUTO DIFF WBC: CPT

## 2023-02-16 PROCEDURE — 80053 COMPREHEN METABOLIC PANEL: CPT

## 2023-02-16 PROCEDURE — 93005 ELECTROCARDIOGRAM TRACING: CPT | Performed by: STUDENT IN AN ORGANIZED HEALTH CARE EDUCATION/TRAINING PROGRAM

## 2023-02-16 PROCEDURE — 99285 EMERGENCY DEPT VISIT HI MDM: CPT

## 2023-02-16 PROCEDURE — 1200000000 HC SEMI PRIVATE

## 2023-02-16 PROCEDURE — 73560 X-RAY EXAM OF KNEE 1 OR 2: CPT

## 2023-02-16 PROCEDURE — 73521 X-RAY EXAM HIPS BI 2 VIEWS: CPT

## 2023-02-16 PROCEDURE — 96374 THER/PROPH/DIAG INJ IV PUSH: CPT

## 2023-02-16 PROCEDURE — 73552 X-RAY EXAM OF FEMUR 2/>: CPT | Performed by: RADIOLOGY

## 2023-02-16 PROCEDURE — 6360000002 HC RX W HCPCS: Performed by: STUDENT IN AN ORGANIZED HEALTH CARE EDUCATION/TRAINING PROGRAM

## 2023-02-16 RX ORDER — METHOCARBAMOL 750 MG/1
750 TABLET, FILM COATED ORAL 4 TIMES DAILY
Status: DISCONTINUED | OUTPATIENT
Start: 2023-02-16 | End: 2023-02-17 | Stop reason: ALTCHOICE

## 2023-02-16 RX ORDER — MORPHINE SULFATE 4 MG/ML
4 INJECTION, SOLUTION INTRAMUSCULAR; INTRAVENOUS ONCE
Status: COMPLETED | OUTPATIENT
Start: 2023-02-16 | End: 2023-02-16

## 2023-02-16 RX ORDER — OXYCODONE HYDROCHLORIDE 5 MG/1
5 TABLET ORAL EVERY 4 HOURS PRN
Status: DISCONTINUED | OUTPATIENT
Start: 2023-02-16 | End: 2023-02-17 | Stop reason: SDUPTHER

## 2023-02-16 RX ORDER — OXYCODONE HYDROCHLORIDE 10 MG/1
10 TABLET ORAL EVERY 4 HOURS PRN
Status: DISCONTINUED | OUTPATIENT
Start: 2023-02-16 | End: 2023-02-17 | Stop reason: SDUPTHER

## 2023-02-16 RX ORDER — FENTANYL CITRATE 50 UG/ML
25 INJECTION, SOLUTION INTRAMUSCULAR; INTRAVENOUS ONCE
Status: COMPLETED | OUTPATIENT
Start: 2023-02-16 | End: 2023-02-16

## 2023-02-16 RX ORDER — GABAPENTIN 100 MG/1
100 CAPSULE ORAL 3 TIMES DAILY
Status: DISCONTINUED | OUTPATIENT
Start: 2023-02-16 | End: 2023-02-21 | Stop reason: HOSPADM

## 2023-02-16 RX ADMIN — MORPHINE SULFATE 4 MG: 4 INJECTION, SOLUTION INTRAMUSCULAR; INTRAVENOUS at 15:06

## 2023-02-16 RX ADMIN — FENTANYL CITRATE 25 MCG: 0.05 INJECTION, SOLUTION INTRAMUSCULAR; INTRAVENOUS at 19:52

## 2023-02-16 ASSESSMENT — PAIN - FUNCTIONAL ASSESSMENT: PAIN_FUNCTIONAL_ASSESSMENT: 0-10

## 2023-02-16 ASSESSMENT — PAIN DESCRIPTION - DESCRIPTORS: DESCRIPTORS: ACHING;SHARP

## 2023-02-16 ASSESSMENT — PAIN DESCRIPTION - ORIENTATION: ORIENTATION: RIGHT

## 2023-02-16 ASSESSMENT — PAIN SCALES - GENERAL
PAINLEVEL_OUTOF10: 4
PAINLEVEL_OUTOF10: 8

## 2023-02-16 ASSESSMENT — PAIN DESCRIPTION - LOCATION: LOCATION: HIP
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
## 2023-02-17 ENCOUNTER — ANESTHESIA (OUTPATIENT)
Dept: OPERATING ROOM | Age: 63
End: 2023-02-17
Payer: MEDICARE

## 2023-02-17 ENCOUNTER — APPOINTMENT (OUTPATIENT)
Dept: GENERAL RADIOLOGY | Age: 63
DRG: 480 | End: 2023-02-17
Payer: MEDICARE

## 2023-02-17 ENCOUNTER — ANESTHESIA EVENT (OUTPATIENT)
Dept: OPERATING ROOM | Age: 63
End: 2023-02-17
Payer: MEDICARE

## 2023-02-17 PROBLEM — S72.141A: Status: ACTIVE | Noted: 2023-02-17

## 2023-02-17 LAB
ABO/RH: NORMAL
ALBUMIN SERPL-MCNC: 3.2 G/DL (ref 3.5–5.2)
ALP BLD-CCNC: 82 U/L (ref 35–104)
ALT SERPL-CCNC: 10 U/L (ref 0–32)
ANION GAP SERPL CALCULATED.3IONS-SCNC: 6 MMOL/L (ref 7–16)
ANTIBODY SCREEN: NORMAL
APTT: 33.1 SEC (ref 24.5–35.1)
AST SERPL-CCNC: 20 U/L (ref 0–31)
BASOPHILS ABSOLUTE: 0.03 E9/L (ref 0–0.2)
BASOPHILS RELATIVE PERCENT: 0.4 % (ref 0–2)
BILIRUB SERPL-MCNC: 0.4 MG/DL (ref 0–1.2)
BUN BLDV-MCNC: 10 MG/DL (ref 6–23)
CALCIUM SERPL-MCNC: 7.9 MG/DL (ref 8.6–10.2)
CHLORIDE BLD-SCNC: 106 MMOL/L (ref 98–107)
CO2: 26 MMOL/L (ref 22–29)
CREAT SERPL-MCNC: 0.7 MG/DL (ref 0.5–1)
EKG ATRIAL RATE: 72 BPM
EKG P AXIS: 69 DEGREES
EKG P-R INTERVAL: 148 MS
EKG Q-T INTERVAL: 414 MS
EKG QRS DURATION: 74 MS
EKG QTC CALCULATION (BAZETT): 453 MS
EKG R AXIS: 58 DEGREES
EKG T AXIS: 48 DEGREES
EKG VENTRICULAR RATE: 72 BPM
EOSINOPHILS ABSOLUTE: 0.08 E9/L (ref 0.05–0.5)
EOSINOPHILS RELATIVE PERCENT: 1.2 % (ref 0–6)
GFR SERPL CREATININE-BSD FRML MDRD: >60 ML/MIN/1.73
GLUCOSE BLD-MCNC: 87 MG/DL (ref 74–99)
HCT VFR BLD CALC: 32.9 % (ref 34–48)
HEMOGLOBIN: 10.8 G/DL (ref 11.5–15.5)
IMMATURE GRANULOCYTES #: 0.06 E9/L
IMMATURE GRANULOCYTES %: 0.9 % (ref 0–5)
INR BLD: 1.1
LYMPHOCYTES ABSOLUTE: 0.79 E9/L (ref 1.5–4)
LYMPHOCYTES RELATIVE PERCENT: 11.7 % (ref 20–42)
MCH RBC QN AUTO: 31 PG (ref 26–35)
MCHC RBC AUTO-ENTMCNC: 32.8 % (ref 32–34.5)
MCV RBC AUTO: 94.5 FL (ref 80–99.9)
MONOCYTES ABSOLUTE: 0.88 E9/L (ref 0.1–0.95)
MONOCYTES RELATIVE PERCENT: 13 % (ref 2–12)
NEUTROPHILS ABSOLUTE: 4.94 E9/L (ref 1.8–7.3)
NEUTROPHILS RELATIVE PERCENT: 72.8 % (ref 43–80)
PDW BLD-RTO: 12.6 FL (ref 11.5–15)
PLATELET # BLD: 216 E9/L (ref 130–450)
PMV BLD AUTO: 9.2 FL (ref 7–12)
POTASSIUM REFLEX MAGNESIUM: 4.9 MMOL/L (ref 3.5–5)
PROTHROMBIN TIME: 11.8 SEC (ref 9.3–12.4)
RBC # BLD: 3.48 E12/L (ref 3.5–5.5)
SODIUM BLD-SCNC: 138 MMOL/L (ref 132–146)
TOTAL PROTEIN: 6 G/DL (ref 6.4–8.3)
WBC # BLD: 6.8 E9/L (ref 4.5–11.5)

## 2023-02-17 PROCEDURE — 85025 COMPLETE CBC W/AUTO DIFF WBC: CPT

## 2023-02-17 PROCEDURE — 2580000003 HC RX 258: Performed by: STUDENT IN AN ORGANIZED HEALTH CARE EDUCATION/TRAINING PROGRAM

## 2023-02-17 PROCEDURE — 85610 PROTHROMBIN TIME: CPT

## 2023-02-17 PROCEDURE — 7100000001 HC PACU RECOVERY - ADDTL 15 MIN: Performed by: STUDENT IN AN ORGANIZED HEALTH CARE EDUCATION/TRAINING PROGRAM

## 2023-02-17 PROCEDURE — 97165 OT EVAL LOW COMPLEX 30 MIN: CPT

## 2023-02-17 PROCEDURE — C1713 ANCHOR/SCREW BN/BN,TIS/BN: HCPCS | Performed by: STUDENT IN AN ORGANIZED HEALTH CARE EDUCATION/TRAINING PROGRAM

## 2023-02-17 PROCEDURE — 2500000003 HC RX 250 WO HCPCS

## 2023-02-17 PROCEDURE — 3600000007 HC SURGERY HYBRID BASE: Performed by: STUDENT IN AN ORGANIZED HEALTH CARE EDUCATION/TRAINING PROGRAM

## 2023-02-17 PROCEDURE — 0QS606Z REPOSITION RIGHT UPPER FEMUR WITH INTRAMEDULLARY INTERNAL FIXATION DEVICE, OPEN APPROACH: ICD-10-PCS | Performed by: STUDENT IN AN ORGANIZED HEALTH CARE EDUCATION/TRAINING PROGRAM

## 2023-02-17 PROCEDURE — 6360000002 HC RX W HCPCS: Performed by: FAMILY MEDICINE

## 2023-02-17 PROCEDURE — 80053 COMPREHEN METABOLIC PANEL: CPT

## 2023-02-17 PROCEDURE — 2580000003 HC RX 258

## 2023-02-17 PROCEDURE — 6370000000 HC RX 637 (ALT 250 FOR IP): Performed by: STUDENT IN AN ORGANIZED HEALTH CARE EDUCATION/TRAINING PROGRAM

## 2023-02-17 PROCEDURE — 73502 X-RAY EXAM HIP UNI 2-3 VIEWS: CPT

## 2023-02-17 PROCEDURE — 6360000002 HC RX W HCPCS: Performed by: STUDENT IN AN ORGANIZED HEALTH CARE EDUCATION/TRAINING PROGRAM

## 2023-02-17 PROCEDURE — 2500000003 HC RX 250 WO HCPCS: Performed by: STUDENT IN AN ORGANIZED HEALTH CARE EDUCATION/TRAINING PROGRAM

## 2023-02-17 PROCEDURE — 27245 TREAT THIGH FRACTURE: CPT | Performed by: STUDENT IN AN ORGANIZED HEALTH CARE EDUCATION/TRAINING PROGRAM

## 2023-02-17 PROCEDURE — 97535 SELF CARE MNGMENT TRAINING: CPT

## 2023-02-17 PROCEDURE — 6360000002 HC RX W HCPCS

## 2023-02-17 PROCEDURE — 86900 BLOOD TYPING SEROLOGIC ABO: CPT

## 2023-02-17 PROCEDURE — 97162 PT EVAL MOD COMPLEX 30 MIN: CPT

## 2023-02-17 PROCEDURE — 3700000000 HC ANESTHESIA ATTENDED CARE: Performed by: STUDENT IN AN ORGANIZED HEALTH CARE EDUCATION/TRAINING PROGRAM

## 2023-02-17 PROCEDURE — 3209999900 FLUORO FOR SURGICAL PROCEDURES

## 2023-02-17 PROCEDURE — 3700000001 HC ADD 15 MINUTES (ANESTHESIA): Performed by: STUDENT IN AN ORGANIZED HEALTH CARE EDUCATION/TRAINING PROGRAM

## 2023-02-17 PROCEDURE — 6370000000 HC RX 637 (ALT 250 FOR IP): Performed by: FAMILY MEDICINE

## 2023-02-17 PROCEDURE — 6360000002 HC RX W HCPCS: Performed by: ANESTHESIOLOGY

## 2023-02-17 PROCEDURE — 86901 BLOOD TYPING SEROLOGIC RH(D): CPT

## 2023-02-17 PROCEDURE — 7100000000 HC PACU RECOVERY - FIRST 15 MIN: Performed by: STUDENT IN AN ORGANIZED HEALTH CARE EDUCATION/TRAINING PROGRAM

## 2023-02-17 PROCEDURE — 2709999900 HC NON-CHARGEABLE SUPPLY: Performed by: STUDENT IN AN ORGANIZED HEALTH CARE EDUCATION/TRAINING PROGRAM

## 2023-02-17 PROCEDURE — 2720000010 HC SURG SUPPLY STERILE: Performed by: STUDENT IN AN ORGANIZED HEALTH CARE EDUCATION/TRAINING PROGRAM

## 2023-02-17 PROCEDURE — 97530 THERAPEUTIC ACTIVITIES: CPT

## 2023-02-17 PROCEDURE — 93010 ELECTROCARDIOGRAM REPORT: CPT | Performed by: INTERNAL MEDICINE

## 2023-02-17 PROCEDURE — 85730 THROMBOPLASTIN TIME PARTIAL: CPT

## 2023-02-17 PROCEDURE — 6370000000 HC RX 637 (ALT 250 FOR IP)

## 2023-02-17 PROCEDURE — 3600000017 HC SURGERY HYBRID ADDL 15MIN: Performed by: STUDENT IN AN ORGANIZED HEALTH CARE EDUCATION/TRAINING PROGRAM

## 2023-02-17 PROCEDURE — C1769 GUIDE WIRE: HCPCS | Performed by: STUDENT IN AN ORGANIZED HEALTH CARE EDUCATION/TRAINING PROGRAM

## 2023-02-17 PROCEDURE — 86850 RBC ANTIBODY SCREEN: CPT

## 2023-02-17 PROCEDURE — 1200000000 HC SEMI PRIVATE

## 2023-02-17 DEVICE — TFNA FENESTRATED HELICAL BLADE 85MM - STERILE
Type: IMPLANTABLE DEVICE | Site: HIP | Status: FUNCTIONAL
Brand: TFN-ADVANCE

## 2023-02-17 DEVICE — SCREW BNE LCK 5X30 MM W/ T25 STARDRV FOR IM NAIL TI STRL: Type: IMPLANTABLE DEVICE | Site: HIP | Status: FUNCTIONAL

## 2023-02-17 DEVICE — IMPLANTABLE DEVICE: Type: IMPLANTABLE DEVICE | Site: HIP | Status: FUNCTIONAL

## 2023-02-17 DEVICE — 11MM/125 DEG TI CANN TFNA 170MM - STERILE
Type: IMPLANTABLE DEVICE | Site: HIP | Status: FUNCTIONAL
Brand: TFN-ADVANCE

## 2023-02-17 RX ORDER — MEPERIDINE HYDROCHLORIDE 25 MG/ML
12.5 INJECTION INTRAMUSCULAR; INTRAVENOUS; SUBCUTANEOUS EVERY 5 MIN PRN
Status: DISCONTINUED | OUTPATIENT
Start: 2023-02-17 | End: 2023-02-17 | Stop reason: HOSPADM

## 2023-02-17 RX ORDER — ROCURONIUM BROMIDE 10 MG/ML
INJECTION, SOLUTION INTRAVENOUS PRN
Status: DISCONTINUED | OUTPATIENT
Start: 2023-02-17 | End: 2023-02-17 | Stop reason: SDUPTHER

## 2023-02-17 RX ORDER — FENTANYL CITRATE 50 UG/ML
INJECTION, SOLUTION INTRAMUSCULAR; INTRAVENOUS PRN
Status: DISCONTINUED | OUTPATIENT
Start: 2023-02-17 | End: 2023-02-17 | Stop reason: SDUPTHER

## 2023-02-17 RX ORDER — SODIUM CHLORIDE 9 MG/ML
INJECTION, SOLUTION INTRAVENOUS PRN
Status: DISCONTINUED | OUTPATIENT
Start: 2023-02-17 | End: 2023-02-17 | Stop reason: HOSPADM

## 2023-02-17 RX ORDER — PROMETHAZINE HYDROCHLORIDE 12.5 MG/1
12.5 TABLET ORAL EVERY 6 HOURS PRN
Status: DISCONTINUED | OUTPATIENT
Start: 2023-02-17 | End: 2023-02-21 | Stop reason: HOSPADM

## 2023-02-17 RX ORDER — ONDANSETRON 2 MG/ML
INJECTION INTRAMUSCULAR; INTRAVENOUS PRN
Status: DISCONTINUED | OUTPATIENT
Start: 2023-02-17 | End: 2023-02-17 | Stop reason: SDUPTHER

## 2023-02-17 RX ORDER — ASPIRIN 81 MG/1
81 TABLET ORAL 2 TIMES DAILY
Qty: 56 TABLET | Refills: 0 | Status: SHIPPED | OUTPATIENT
Start: 2023-02-17 | End: 2023-02-21 | Stop reason: SDUPTHER

## 2023-02-17 RX ORDER — ASPIRIN 81 MG/1
81 TABLET ORAL 2 TIMES DAILY
Status: DISCONTINUED | OUTPATIENT
Start: 2023-02-17 | End: 2023-02-21 | Stop reason: HOSPADM

## 2023-02-17 RX ORDER — SODIUM CHLORIDE 9 MG/ML
INJECTION, SOLUTION INTRAVENOUS PRN
Status: DISCONTINUED | OUTPATIENT
Start: 2023-02-17 | End: 2023-02-21 | Stop reason: HOSPADM

## 2023-02-17 RX ORDER — SODIUM CHLORIDE 0.9 % (FLUSH) 0.9 %
5-40 SYRINGE (ML) INJECTION EVERY 12 HOURS SCHEDULED
Status: DISCONTINUED | OUTPATIENT
Start: 2023-02-17 | End: 2023-02-17 | Stop reason: HOSPADM

## 2023-02-17 RX ORDER — PHENYLEPHRINE HCL IN 0.9% NACL 1 MG/10 ML
SYRINGE (ML) INTRAVENOUS PRN
Status: DISCONTINUED | OUTPATIENT
Start: 2023-02-17 | End: 2023-02-17 | Stop reason: SDUPTHER

## 2023-02-17 RX ORDER — MIDAZOLAM HYDROCHLORIDE 1 MG/ML
INJECTION INTRAMUSCULAR; INTRAVENOUS PRN
Status: DISCONTINUED | OUTPATIENT
Start: 2023-02-17 | End: 2023-02-17 | Stop reason: SDUPTHER

## 2023-02-17 RX ORDER — BACLOFEN 10 MG/1
10 TABLET ORAL 3 TIMES DAILY
Status: DISCONTINUED | OUTPATIENT
Start: 2023-02-17 | End: 2023-02-21 | Stop reason: HOSPADM

## 2023-02-17 RX ORDER — BUPROPION HYDROCHLORIDE 150 MG/1
150 TABLET, EXTENDED RELEASE ORAL 2 TIMES DAILY
Status: DISCONTINUED | OUTPATIENT
Start: 2023-02-17 | End: 2023-02-21 | Stop reason: HOSPADM

## 2023-02-17 RX ORDER — ONDANSETRON 2 MG/ML
4 INJECTION INTRAMUSCULAR; INTRAVENOUS EVERY 6 HOURS PRN
Status: DISCONTINUED | OUTPATIENT
Start: 2023-02-17 | End: 2023-02-21 | Stop reason: HOSPADM

## 2023-02-17 RX ORDER — MODAFINIL 100 MG/1
200 TABLET ORAL 2 TIMES DAILY
Status: DISCONTINUED | OUTPATIENT
Start: 2023-02-17 | End: 2023-02-21 | Stop reason: HOSPADM

## 2023-02-17 RX ORDER — PROPOFOL 10 MG/ML
INJECTION, EMULSION INTRAVENOUS PRN
Status: DISCONTINUED | OUTPATIENT
Start: 2023-02-17 | End: 2023-02-17 | Stop reason: SDUPTHER

## 2023-02-17 RX ORDER — ACETAMINOPHEN 325 MG/1
650 TABLET ORAL EVERY 6 HOURS PRN
Status: DISCONTINUED | OUTPATIENT
Start: 2023-02-17 | End: 2023-02-21 | Stop reason: HOSPADM

## 2023-02-17 RX ORDER — KETAMINE HCL IN NACL, ISO-OSM 100MG/10ML
SYRINGE (ML) INJECTION PRN
Status: DISCONTINUED | OUTPATIENT
Start: 2023-02-17 | End: 2023-02-17 | Stop reason: SDUPTHER

## 2023-02-17 RX ORDER — FENTANYL CITRATE 50 UG/ML
50 INJECTION, SOLUTION INTRAMUSCULAR; INTRAVENOUS
Status: DISCONTINUED | OUTPATIENT
Start: 2023-02-17 | End: 2023-02-21 | Stop reason: HOSPADM

## 2023-02-17 RX ORDER — OXYCODONE HYDROCHLORIDE AND ACETAMINOPHEN 5; 325 MG/1; MG/1
1 TABLET ORAL EVERY 6 HOURS PRN
Qty: 28 TABLET | Refills: 0 | Status: SHIPPED | OUTPATIENT
Start: 2023-02-17 | End: 2023-02-21 | Stop reason: SDUPTHER

## 2023-02-17 RX ORDER — LIDOCAINE HYDROCHLORIDE 20 MG/ML
INJECTION, SOLUTION INTRAVENOUS PRN
Status: DISCONTINUED | OUTPATIENT
Start: 2023-02-17 | End: 2023-02-17 | Stop reason: SDUPTHER

## 2023-02-17 RX ORDER — SODIUM CHLORIDE 0.9 % (FLUSH) 0.9 %
10 SYRINGE (ML) INJECTION EVERY 12 HOURS SCHEDULED
Status: DISCONTINUED | OUTPATIENT
Start: 2023-02-17 | End: 2023-02-21 | Stop reason: HOSPADM

## 2023-02-17 RX ORDER — SODIUM CHLORIDE 0.9 % (FLUSH) 0.9 %
5-40 SYRINGE (ML) INJECTION PRN
Status: DISCONTINUED | OUTPATIENT
Start: 2023-02-17 | End: 2023-02-17 | Stop reason: HOSPADM

## 2023-02-17 RX ORDER — SODIUM CHLORIDE 0.9 % (FLUSH) 0.9 %
10 SYRINGE (ML) INJECTION PRN
Status: DISCONTINUED | OUTPATIENT
Start: 2023-02-17 | End: 2023-02-21 | Stop reason: HOSPADM

## 2023-02-17 RX ORDER — POLYETHYLENE GLYCOL 3350 17 G/17G
17 POWDER, FOR SOLUTION ORAL DAILY PRN
Status: DISCONTINUED | OUTPATIENT
Start: 2023-02-17 | End: 2023-02-21 | Stop reason: HOSPADM

## 2023-02-17 RX ORDER — LEVOTHYROXINE SODIUM 0.05 MG/1
50 TABLET ORAL DAILY
Status: DISCONTINUED | OUTPATIENT
Start: 2023-02-17 | End: 2023-02-21 | Stop reason: HOSPADM

## 2023-02-17 RX ORDER — AMANTADINE HYDROCHLORIDE 100 MG/1
100 CAPSULE, GELATIN COATED ORAL 2 TIMES DAILY
Status: DISCONTINUED | OUTPATIENT
Start: 2023-02-17 | End: 2023-02-21 | Stop reason: HOSPADM

## 2023-02-17 RX ORDER — LOSARTAN POTASSIUM 50 MG/1
50 TABLET ORAL DAILY
Status: DISCONTINUED | OUTPATIENT
Start: 2023-02-17 | End: 2023-02-21 | Stop reason: HOSPADM

## 2023-02-17 RX ORDER — ACETAMINOPHEN 650 MG/1
650 SUPPOSITORY RECTAL EVERY 6 HOURS PRN
Status: DISCONTINUED | OUTPATIENT
Start: 2023-02-17 | End: 2023-02-21 | Stop reason: HOSPADM

## 2023-02-17 RX ORDER — DEXAMETHASONE SODIUM PHOSPHATE 10 MG/ML
INJECTION INTRAMUSCULAR; INTRAVENOUS PRN
Status: DISCONTINUED | OUTPATIENT
Start: 2023-02-17 | End: 2023-02-17 | Stop reason: SDUPTHER

## 2023-02-17 RX ORDER — MODAFINIL 100 MG/1
200 TABLET ORAL 2 TIMES DAILY
Status: DISCONTINUED | OUTPATIENT
Start: 2023-02-17 | End: 2023-02-17 | Stop reason: CLARIF

## 2023-02-17 RX ORDER — OXYCODONE HYDROCHLORIDE AND ACETAMINOPHEN 5; 325 MG/1; MG/1
1 TABLET ORAL EVERY 6 HOURS PRN
Status: DISCONTINUED | OUTPATIENT
Start: 2023-02-17 | End: 2023-02-21 | Stop reason: HOSPADM

## 2023-02-17 RX ORDER — DIPHENHYDRAMINE HYDROCHLORIDE 50 MG/ML
12.5 INJECTION INTRAMUSCULAR; INTRAVENOUS
Status: DISCONTINUED | OUTPATIENT
Start: 2023-02-17 | End: 2023-02-17 | Stop reason: HOSPADM

## 2023-02-17 RX ORDER — SODIUM CHLORIDE 9 MG/ML
INJECTION, SOLUTION INTRAVENOUS CONTINUOUS PRN
Status: DISCONTINUED | OUTPATIENT
Start: 2023-02-17 | End: 2023-02-17 | Stop reason: SDUPTHER

## 2023-02-17 RX ADMIN — BACLOFEN 10 MG: 10 TABLET ORAL at 21:27

## 2023-02-17 RX ADMIN — Medication 10 ML: at 21:01

## 2023-02-17 RX ADMIN — DEXAMETHASONE SODIUM PHOSPHATE 10 MG: 10 INJECTION INTRAMUSCULAR; INTRAVENOUS at 08:05

## 2023-02-17 RX ADMIN — METHOCARBAMOL 750 MG: 750 TABLET ORAL at 01:02

## 2023-02-17 RX ADMIN — CEFAZOLIN 2000 MG: 2 INJECTION, POWDER, FOR SOLUTION INTRAMUSCULAR; INTRAVENOUS at 23:38

## 2023-02-17 RX ADMIN — OXYCODONE AND ACETAMINOPHEN 1 TABLET: 5; 325 TABLET ORAL at 18:56

## 2023-02-17 RX ADMIN — BUPROPION HYDROCHLORIDE 150 MG: 150 TABLET, EXTENDED RELEASE ORAL at 01:02

## 2023-02-17 RX ADMIN — Medication 50 MCG: at 08:10

## 2023-02-17 RX ADMIN — LIDOCAINE HYDROCHLORIDE 45 MG: 20 INJECTION, SOLUTION INTRAVENOUS at 07:49

## 2023-02-17 RX ADMIN — BACLOFEN 10 MG: 10 TABLET ORAL at 16:43

## 2023-02-17 RX ADMIN — GABAPENTIN 100 MG: 100 CAPSULE ORAL at 01:01

## 2023-02-17 RX ADMIN — FENTANYL CITRATE 50 MCG: 0.05 INJECTION, SOLUTION INTRAMUSCULAR; INTRAVENOUS at 21:01

## 2023-02-17 RX ADMIN — FENTANYL CITRATE 50 MCG: 50 INJECTION, SOLUTION INTRAMUSCULAR; INTRAVENOUS at 08:17

## 2023-02-17 RX ADMIN — BUPROPION HYDROCHLORIDE 150 MG: 150 TABLET, EXTENDED RELEASE ORAL at 21:27

## 2023-02-17 RX ADMIN — CEFAZOLIN 2000 MG: 2 INJECTION, POWDER, FOR SOLUTION INTRAMUSCULAR; INTRAVENOUS at 16:43

## 2023-02-17 RX ADMIN — TRANEXAMIC ACID 1000 MG: 1 INJECTION, SOLUTION INTRAVENOUS at 08:14

## 2023-02-17 RX ADMIN — ASPIRIN 81 MG: 81 TABLET, FILM COATED ORAL at 16:42

## 2023-02-17 RX ADMIN — HYDROMORPHONE HYDROCHLORIDE 0.5 MG: 1 INJECTION, SOLUTION INTRAMUSCULAR; INTRAVENOUS; SUBCUTANEOUS at 09:24

## 2023-02-17 RX ADMIN — MODAFINIL 200 MG: 100 TABLET ORAL at 21:26

## 2023-02-17 RX ADMIN — PROPOFOL 100 MG: 10 INJECTION, EMULSION INTRAVENOUS at 07:49

## 2023-02-17 RX ADMIN — SUGAMMADEX 90 MG: 100 INJECTION, SOLUTION INTRAVENOUS at 08:41

## 2023-02-17 RX ADMIN — CEFAZOLIN 2000 MG: 2 INJECTION, POWDER, FOR SOLUTION INTRAMUSCULAR; INTRAVENOUS at 07:58

## 2023-02-17 RX ADMIN — AMANTADINE HYDROCHLORIDE 100 MG: 100 CAPSULE ORAL at 21:27

## 2023-02-17 RX ADMIN — AMANTADINE HYDROCHLORIDE 100 MG: 100 CAPSULE ORAL at 01:01

## 2023-02-17 RX ADMIN — Medication 20 MG: at 07:49

## 2023-02-17 RX ADMIN — ASPIRIN 81 MG: 81 TABLET, FILM COATED ORAL at 21:26

## 2023-02-17 RX ADMIN — ROCURONIUM BROMIDE 5 MG: 50 INJECTION INTRAVENOUS at 08:17

## 2023-02-17 RX ADMIN — ONDANSETRON 4 MG: 2 INJECTION INTRAMUSCULAR; INTRAVENOUS at 08:31

## 2023-02-17 RX ADMIN — ROCURONIUM BROMIDE 20 MG: 50 INJECTION INTRAVENOUS at 07:49

## 2023-02-17 RX ADMIN — FENTANYL CITRATE 50 MCG: 0.05 INJECTION, SOLUTION INTRAMUSCULAR; INTRAVENOUS at 01:02

## 2023-02-17 RX ADMIN — GABAPENTIN 100 MG: 100 CAPSULE ORAL at 16:42

## 2023-02-17 RX ADMIN — FENTANYL CITRATE 50 MCG: 50 INJECTION, SOLUTION INTRAMUSCULAR; INTRAVENOUS at 07:49

## 2023-02-17 RX ADMIN — SODIUM CHLORIDE: 9 INJECTION, SOLUTION INTRAVENOUS at 07:46

## 2023-02-17 RX ADMIN — MIDAZOLAM 1 MG: 1 INJECTION INTRAMUSCULAR; INTRAVENOUS at 07:38

## 2023-02-17 RX ADMIN — GABAPENTIN 100 MG: 100 CAPSULE ORAL at 21:27

## 2023-02-17 RX ADMIN — TRANEXAMIC ACID 1000 MG: 1 INJECTION, SOLUTION INTRAVENOUS at 10:52

## 2023-02-17 ASSESSMENT — PAIN DESCRIPTION - LOCATION
LOCATION: HIP

## 2023-02-17 ASSESSMENT — PAIN SCALES - GENERAL
PAINLEVEL_OUTOF10: 4
PAINLEVEL_OUTOF10: 0
PAINLEVEL_OUTOF10: 0
PAINLEVEL_OUTOF10: 7
PAINLEVEL_OUTOF10: 7
PAINLEVEL_OUTOF10: 6
PAINLEVEL_OUTOF10: 2
PAINLEVEL_OUTOF10: 6
PAINLEVEL_OUTOF10: 7
PAINLEVEL_OUTOF10: 5
PAINLEVEL_OUTOF10: 0

## 2023-02-17 ASSESSMENT — PAIN DESCRIPTION - PAIN TYPE
TYPE: SURGICAL PAIN

## 2023-02-17 ASSESSMENT — PAIN DESCRIPTION - ORIENTATION
ORIENTATION: RIGHT

## 2023-02-17 ASSESSMENT — PAIN DESCRIPTION - ONSET
ONSET: ON-GOING
ONSET: PROGRESSIVE
ONSET: ON-GOING

## 2023-02-17 ASSESSMENT — PAIN DESCRIPTION - FREQUENCY
FREQUENCY: CONTINUOUS

## 2023-02-17 ASSESSMENT — PAIN DESCRIPTION - DESCRIPTORS
DESCRIPTORS: ACHING
DESCRIPTORS: ACHING;BURNING
DESCRIPTORS: ACHING;DISCOMFORT;DULL
DESCRIPTORS: ACHING;BURNING;CRAMPING
DESCRIPTORS: ACHING
DESCRIPTORS: ACHING;BURNING;CRAMPING

## 2023-02-17 ASSESSMENT — PAIN - FUNCTIONAL ASSESSMENT
PAIN_FUNCTIONAL_ASSESSMENT: PREVENTS OR INTERFERES SOME ACTIVE ACTIVITIES AND ADLS
PAIN_FUNCTIONAL_ASSESSMENT: PREVENTS OR INTERFERES SOME ACTIVE ACTIVITIES AND ADLS

## 2023-02-17 ASSESSMENT — LIFESTYLE VARIABLES
HOW OFTEN DO YOU HAVE A DRINK CONTAINING ALCOHOL: NEVER
HOW MANY STANDARD DRINKS CONTAINING ALCOHOL DO YOU HAVE ON A TYPICAL DAY: PATIENT DOES NOT DRINK

## 2023-02-17 NOTE — BRIEF OP NOTE
Brief Postoperative Note      Patient: Alyse Houston  YOB: 1960  MRN: [id number]    Date of Procedure: 2/17/2023    Pre-Op Diagnosis: RIGHT INTERTROCHANTERIC HIP FRACTURE CLOSED    Post-Op Diagnosis: Same       Procedure(s):  HIP OPEN REDUCTION INTERNAL FIXATION-RIGHT    Surgeon(s):  Maylin Gold DO    Assistant:  Resident: Leatha Hall DO; Mahad Bird DO; Lucrecia Garcia DO    Anesthesia: General    Estimated Blood Loss (mL): less than 50     Complications: None    Specimens:   * No specimens in log *    Implants:  Implant Name Type Inv.  Item Serial No.  Lot No. LRB No. Used Action   NAIL IM L170MM [id number] 125DEG SHT PROX FEM GRN TI-15MO - [id number]  NAIL IM L170MM [id number] NK 125DEG SHT PROX FEM GRN TI-15MO  DEPU[employer] [id number] Right 1 Implanted   GUIDEWIRE ORTH 3.2X400 MM W/ DRL TIP STRL - [id number]  GUIDEWIRE ORTH 3.2X400 MM W/ DRL TIP STRL  [employer]  Right 3 Implanted   BLADE IM L85MM DIA10.35MM PROX FEM G TI CLAUDIA FEN SO FOR - [id number]  BLADE IM L85MM DIA10.35MM PROX FEM G TI CLAUDIA FEN SO FOR  [employer] [id number] Right 1 Implanted   SCREW BNE LCK 5X30 MM W/ T25 STARDRV FOR IM NAIL TI STRL - [id number]  SCREW BNE LCK 5X30 MM W/ T25 STARDRV FOR IM NAIL TI STRL  DE[employer]  Right 1 Implanted         Drains: * No LDAs found *    Findings: Right femur cephalomedullary nail for intertrochanteric hip fracture    Electronically signed by Willy Ivan DO on 2/17/2023 at 8:36 AM

## 2023-02-17 NOTE — H&P
Hospitalist History & Physical      PCP: José Luis Li MD    Date of Service: Pt seen/examined on 2/16/2023     Chief Complaint:  had concerns including Fall (PT HX OF MS . FALL RIGHT HIP PAIN. GIVEN 50MCG FENTANYL PTA). History Of Present Illness:    Ms. Johanna Ralph, a 57y.o. year old female  who  has a past medical history of Confusion, Depressive disorder, Diastolic CHF ([address] Leukopenia, MS (multiple sclerosis) ([address] Neuromuscular disorder ([address] and Pneumonia. Patient presented to the emergency department after falling at home. Now with complaints of right hip and leg pain. Denies losing consciousness or hitting her head. Patient denies any chest pain, fever, chills, nausea, vomiting, headache, dizziness/lightheadedness, abdominal pain. Vital signs are within normal limits and stable although she is somewhat hypertensive with pressure 173/94. Laboratory studies were unremarkable. Imaging revealed a right proximal femur intertrochanteric nondisplaced fracture. Orthopedic service was consulted. Medicine consulted for admission. Past Medical History:   Diagnosis Date    Confusion 2/5/2015    Depressive disorder 1/32/7073    Diastolic CHF ([address] 5/1/3817    Leukopenia 2/4/2015    MS (multiple sclerosis) (HCC)     Neuromuscular disorder ([facility]     Pneumonia        Past Surgical History:   Procedure Laterality Date    HYSTERECTOMY      NM OPEN SKULL SUPRATENT EXPLORE      Cerebral Meninges, Incise    NM VENTRICULO-CISTERNOSTOMY      Ventricle Shunt - Abdomen       Prior to Admission medications    Medication Sig Start Date End Date Taking? Authorizing Provider   Semaglutide (OZEMPIC, 0.25 OR 0.5 MG/DOSE, SC) Inject into the skin Once weekly.     Historical Provider, MD   losartan (COZAAR) 50 MG tablet Take 50 mg by mouth daily    Historical Provider, MD   levothyroxine (SYNTHROID) 50 MCG tablet Take 50 mcg by mouth Daily    Historical Provider, MD   baclofen (LIORESAL) 10 MG tablet Take 10 mg by mouth 3 times daily    Historical Provider, MD   BIOTIN 5000 PO Take by mouth    Historical Provider, MD   vitamin B-6 (PYRIDOXINE) 100 MG tablet Take 100 mg by mouth daily    Historical Provider, MD   Cholecalciferol (D3 ADULT PO) Take by mouth    Historical Provider, MD   MECLIZINE HCL PO Take 12.5 mg by mouth     Historical Provider, MD   Propranolol HCl (INDERAL PO) Take 80 mg by mouth     Historical Provider, MD   Ocrelizumab (OCREVUS IV) Infuse intravenously    Historical Provider, MD   buPROPion (WELLBUTRIN SR) 150 MG extended release tablet Take 150 mg by mouth 2 times daily    Historical Provider, MD   modafinil (PROVIGIL) 200 MG tablet Take 200 mg by mouth 2 times daily    Historical Provider, MD   acetaminophen 650 MG TABS Take 650 mg by mouth every 4 hours as needed for Pain (For mild pain level 1-3 or for fever > 100.5). 1/18/13   Sincere Egan MD   amantadine (SYMMETREL) 100 MG capsule Take 100 mg by mouth 2 times daily.      Historical Provider, MD         Allergies:  Nickel    Social History:    TOBACCO:   reports that she has never smoked. She has never used smokeless tobacco.  ETOH:   reports no history of alcohol use.    Family History:    Reviewed in detail and negative for DM, CAD, Cancer, CVA. Positive as follows\"  No family history on file.    REVIEW OF SYSTEMS:   Pertinent positives as noted in the HPI. All other systems reviewed and negative.    PHYSICAL EXAM:  BP (!) 173/94   Pulse 91   Temp 98.2 °F (36.8 °C)   Resp 16   SpO2 95%   General appearance: No apparent distress, appears stated age and cooperative.  HEENT: Normal cephalic, atraumatic without obvious deformity. Pupils equal, round, and reactive to light.  Extra ocular muscles intact. Conjunctivae/corneas clear.  Neck: Supple, with full range of motion. No jugular venous distention. Trachea midline.  Respiratory: CTA  Cardiovascular: RRR  Abdomen: S/NT/ND  Musculoskeletal: No clubbing, cyanosis, edema of bilateral  lower extremities. Brisk capillary refill. Skin: Normal skin color. No rashes or lesions. Neurologic:  Neurovascularly intact without any focal sensory/motor deficits. Cranial nerves: II-XII intact, grossly non-focal.  Psychiatric: Alert and oriented, thought content appropriate, normal insight    Reviewed EKG and CXR personally      CBC:   Recent Labs     02/16/23  1652   WBC 8.3   RBC 2.96*   HGB 9.3*   HCT 28.3*   MCV 95.6   RDW 12.8        BMP:   Recent Labs     02/16/23  1835      K 4.2      CO2 24   BUN 11   CREATININE 0.7     LFT:  Recent Labs     02/16/23  1835   PROT 6.7   ALKPHOS 101   ALT 12   AST 19   BILITOT 0.3     CE:  No results for input(s): [place] in the last 72 hours. PT/INR: No results for input(s): INR, APTT in the last 72 hours. BNP: No results for input(s): BNP in the last 72 hours. ESR:   Lab Results   Component Value Date    SEDRATE 10 02/03/2015     CRP: No results found for: CRP  D Dimer: No results found for: DDIMER   Folate and B12: No results found for: YSUCLDIQ02, No results found for: FOLATE  Lactic Acid:   Lab Results   Component Value Date    LACTA 0.8 04/21/2017     Thyroid Studies: No results found for: TSH, P6XVEBR, L3BLGKL, THYROIDAB    Oupatient labs:  Lab Results   Component Value Date    INR 1.1 04/21/2017    LABA1C 5.0 02/04/2015       Urinalysis:    Lab Results   Component Value Date/Time    NITRU Negative 04/21/2017 02:15 PM    WBCUA 0-1 02/07/2015 02:12 PM    BACTERIA FEW 02/07/2015 02:12 PM    RBCUA NONE 02/07/2015 02:12 PM    RBCUA NONE 01/11/2013 12:30 AM    BLOODU Negative 04/21/2017 02:15 PM    SPECGRAV <=1.005 04/21/2017 02:15 PM    GLUCOSEU Negative 04/21/2017 02:15 PM       Imaging:  XR HIP BILATERAL W AP PELVIS (2 VIEWS)    Result Date: 2/16/2023  EXAMINATION: ONE XRAY VIEW OF THE PELVIS AND TWO XRAY VIEWS OF EACH OF THE BILATERAL HIPS 2/16/2023 4:11 pm COMPARISON: None.  HISTORY: ORDERING SYSTEM PROVIDED HISTORY: fall TECHNOLOGIST PROVIDED HISTORY: Reason for exam:->fall What reading provider will be dictating this exam?->CRC FINDINGS: No evidence of pelvic fracture. Bilateral hips demonstrate normal alignment. No focal osseous lesion. SI joints are symmetric. There is right hip soft tissue swelling. No acute abnormality of the pelvis and bilateral hips. XR FEMUR RIGHT (MIN 2 VIEWS)    Result Date: 2/16/2023  EXAMINATION: XRAY VIEWS OF THE RIGHT FEMUR 2/16/2023 4:11 pm COMPARISON: None. HISTORY: ORDERING SYSTEM PROVIDED HISTORY: right hip and leg pain. fall TECHNOLOGIST PROVIDED HISTORY: Reason for exam:->right hip and leg pain. fall What reading provider will be dictating this exam?->CRC FINDINGS: Bones are demineralized. There is no evidence of acute fracture. There is normal alignment. No acute joint abnormality. No focal osseous lesion. There is diffuse soft tissue swelling of the proximal thigh/hip. No acute osseous abnormality. XR CHEST PORTABLE    Result Date: 2/16/2023  EXAMINATION: ONE XRAY VIEW OF THE CHEST 2/16/2023 4:11 pm COMPARISON: None. HISTORY: ORDERING SYSTEM PROVIDED HISTORY: fall TECHNOLOGIST PROVIDED HISTORY: Reason for exam:->fall What reading provider will be dictating this exam?->CRC FINDINGS: The lungs are without acute focal process. There is no effusion or pneumothorax. The cardiomediastinal silhouette is without acute process. The osseous structures are without acute process. No acute process. CT HIP RIGHT WO CONTRAST    Result Date: 2/16/2023  EXAMINATION: CT OF THE RIGHT HIP WITHOUT CONTRAST 2/16/2023 6:26 pm TECHNIQUE: CT of the right hip was performed without the administration of intravenous contrast.  Multiplanar reformatted images are provided for review. Automated exposure control, iterative reconstruction, and/or weight based adjustment of the mA/kV was utilized to reduce the radiation dose to as low as reasonably achievable.  COMPARISON: Bilateral hip series 02/16/2023. HISTORY ORDERING SYSTEM PROVIDED HISTORY: fall, right hip pain TECHNOLOGIST PROVIDED HISTORY: Reason for exam:->fall, right hip pain Decision Support Exception - unselect if not a suspected or confirmed emergency medical condition->Emergency Medical Condition (MA) What reading provider will be dictating this exam?->CRC FINDINGS: Bones: A right proximal femur intertrochanteric nondisplaced fracture is identified. There is some surrounding fracture related soft tissue swelling. Joint alignment is intact. Soft Tissue: Small right hip joint effusion is noted. Joint: No significant degenerative changes. No osseous erosions. Right proximal femur intertrochanteric nondisplaced fracture. ASSESSMENT:  -Right femur fracture  -Fall at home  -Hypertension  -Hyperlipidemia  -Hypothyroidism  -History of multiple sclerosis      PLAN:  -Admit to medicine  -Consult orthopedic service  -Pain management  -N.p.o. now  -Normal saline 75 mL/h  -Hold anticoagulants  -Continue home medications    This patient is a Dutta surgical risk class I with less than 1% chance of perioperative complications. No contraindications to surgery identified. Diet: No diet orders on file  Code Status: Prior  Surrogate decision maker confirmed with patient:   Extended Emergency Contact Information  Primary Emergency Contact: AnaSabrina de  Address: [address] [place] [address]  Home Phone: [phone]  Relation: Parent  Secondary Emergency Contact: Faustina Faye  Home Phone: [phone]  Relation: Brother/Sister    DVT Prophylaxis: []Lovenox []Heparin []PCD [] [address] []Encouraged ambulation  Disposition: []Med/Surg [] Intermediate [] ICU/CCU  Admit status: [] Observation [] Inpatient     +++++++++++++++++++++++++++++++++++++++++++++++++  Bettylou Sicks, DO  +++++++++++++++++++++++++++++++++++++++++++++++++  NOTE: This report was transcribed using voice recognition software.  Every effort was made to ensure accuracy; however, inadvertent computerized transcription errors may be present.
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
## 2023-02-17 NOTE — CONSULTS
Department of Orthopedic Surgery  Resident Consult Note          Reason for Consult: Right Hip Pain    HISTORY OF PRESENT ILLNESS:       Patient is a 57 y.o. female who presents with hip pain after a fall. Patient reports shortly prior to arrival, she fell at home. This was a ground-level fall and landing on her right side. Pain is localized to the right hip and nonradiating. Patient denies radicular symptoms. She was later transported to the emergency department via EMS. Patient has a history of MS, heart failure, leukopenia, CHF, COPD, DM. Patient report. Previous Hip pain -  no. Anticoagulation - none. The patient is currently disabled due to her multiple sclerosis. The patient is community Ambulator with assist  - walker. Pt lives at home. Previous Orthopedic Surgeon - no  Denies numbness/tingling/paresthesias. Denies any other orthopedic complaints at this time. Tobacco use: None  Alcohol use: None  Illicit drug use: None    Past Medical History:        Diagnosis Date    Confusion 2/5/2015    Depressive disorder 3/03/5326    Diastolic CHF ([facility] [address] 8/1/8409    Leukopenia 2/4/2015    MS (multiple sclerosis) ([facility])     Neuromuscular disorder ([facility])     Pneumonia      Past Surgical History:        Procedure Laterality Date    HYSTERECTOMY      [place] OPEN SKULL SUPRATENT EXPLORE      Cerebral Meninges, Incise    [place] VENTRICULO-CISTERNOSTOMY      Ventricle Shunt - Abdomen     Current Medications:   No current facility-administered medications for this encounter. Allergies:  Nickel    Social History:   TOBACCO:   reports that she has never smoked. She has never used smokeless tobacco.  ETOH:   reports no history of alcohol use. DRUGS:   reports no history of drug use. ACTIVITIES OF DAILY LIVING:    OCCUPATION:    Family History:   No family history on file.     REVIEW OF SYSTEMS:  CONSTITUTIONAL:  negative for  fevers, chills  EYES:  negative for blurred vision, visual disturbance  HEENT:  negative for  hearing loss, voice change  RESPIRATORY:  negative for  dyspnea, wheezing  CARDIOVASCULAR:  negative for  chest pain, palpitations  GASTROINTESTINAL:  negative for nausea, vomiting  GENITOURINARY:  negative for frequency, urinary incontinence  HEMATOLOGIC/LYMPHATIC:  negative for bleeding and petechiae  MUSCULOSKELETAL:  positive for  pain  NEUROLOGICAL:  negative for headaches, dizziness  BEHAVIOR/PSYCH:  negative for increased agitation and anxiety    PHYSICAL EXAM:    VITALS:  BP (!) 173/94   Pulse 91   Temp 98.2 °F (36.8 °C)   Resp 16   SpO2 95%   CONSTITUTIONAL:  awake, alert, cooperative, no apparent distress, and appears stated age  General appearance: alert, well appearing, and in no distress,  normal appearing weight  Mental status: alert, oriented to person, place, and time, normal mood, behavior, speech, dress, motor activity, and thought processes  Abdomen: soft, nondistended   Resp:   resp easy and unlabored, no audible wheezes note  Cardiac: distal pulses palpable, skin well perfused  Neurological: alert, oriented X3, normal speech, no focal findings or movement disorder noted, motor and sensory grossly normal bilaterally, normal muscle tone, no tremors, strength 5/5, normal gait and station  HEENT: normochephalic atraumatic, external ears and eyes normal, sclera normal, neck supple  Extremities:   peripheral pulses normal, no edema, redness or tenderness in the calves   Skin: normal coloration, no rashes or open wounds, no suspicious skin lesions noted  Psych: Affect euthymic   MUSCULOSKELETAL:  Right lower Extremity:  Shortened and externally rotated  +Log roll  + Heel Strike  -TTP over Knee and Ankle  Skin intact with no signs of degloving  Compartments soft and compressible, calf non-tender  +DP & PT pulses, Brisk Cap refill, Toes warm and perfused  Sensation grossly intact superficial/deep peroneal,saphenous,sural,tibial n. distributions  +GS/TA/EHL.      Secondary Exam:   bilateralUE: No obvious signs of trauma. -TTP to fingers, hand, wrist, forearm, elbow, humerus, shoulder or clavicle. -- Patient able to flex/extend fingers, wrist, elbow and shoulder with active and passive ROM without pain, +2/4 Radial pulse, cap refill <3sec, +AIN/PIN/Radial/Ulnar/Median N, distal sensation grossly intact to C4-T1 dermatomes, compartments soft and compressible. leftLE: No obvious signs of trauma. -TTP to foot, ankle, leg, knee, thigh, hip.-- Patient able to flex/extend toes, ankle, knee and hip with active and passive ROM without pain,+2/4 DP & PT pulses, cap refill <3sec, +5/5 PF/DF/EHL, distal sensation grossly intact to L4-S1 dermatomes, compartments soft and compressible. Pelvis: -TTP, -Log roll, -Heel strike     DATA:    CBC:   Lab Results   Component Value Date/Time    WBC 8.3 02/16/2023 04:52 PM    RBC 2.96 02/16/2023 04:52 PM    HGB 9.3 02/16/2023 04:52 PM    HCT 28.3 02/16/2023 04:52 PM    MCV 95.6 02/16/2023 04:52 PM    MCH 31.4 02/16/2023 04:52 PM    MCHC 32.9 02/16/2023 04:52 PM    RDW 12.8 02/16/2023 04:52 PM     02/16/2023 04:52 PM    MPV 9.7 02/16/2023 04:52 PM     PT/INR:    Lab Results   Component Value Date/Time    PROTIME 12.2 04/21/2017 11:45 AM    INR 1.1 04/21/2017 11:45 AM     Lactic Acid :   Lab Results   Component Value Date/Time    LACTA 0.8 04/21/2017 11:45 AM       Radiology Review:  02/16/23 - XR AP pelvis right femur demonstrating slight cortical defect at the lesser trochanter. Difficult to visualize fracture on these views. No acute fracture dislocation noted at the left hip and femur. CT right hip demonstrates nondisplaced 2 part intertrochanteric fracture. IMPRESSION:   Closed, Right Intertrochanteric Fracture    PLAN:  Plan for surgery with Dr. Chioma Chacon on 2/17/2023  Risk, benefits and treatment options were discussed and has verbalized understanding of options.  The possibility of complications were also discussed to include but not limited to nerve damage, infection, problems with wound healing, vascular injury, chronic pain, stiffness, dysfunction, nonhealing of the bone, symptomatic hardware and/or its failure, need for subsequent surgery, dislocation, and blood clots as well as medical related problems and other problems not specifically discussed. Risk of anesthesia also discussed to include death. After all questions and concerns were address, she agreed to proceed with the procedure. NPO effective at midnight, Needs medical assessment for surgery  RLE Non-weight bearing  Pre-op Labs and Imaging Completed  Pain control IV/PO  Hold Anticoagulation, patient can remain on pneumatic compression device  Treatment consent  All questions and concerns from patient and family addressed, and patient is agreeable to plan.    Review and discuss with Dr. Pk Newton

## 2023-02-17 NOTE — DISCHARGE INSTRUCTIONS
[facility]) Department of Orthopedic Surgery  [address]  [address]    Dr. Mark Bernstein, DO    Orthopaedics Discharge Instructions   Weight bearing Status - Weight bearing as tolerated - on right lower Extremity  Pain medication Per Prescriptions  Contact Office for Medication Refill- [phone]  Office can refill pain med every 7 days  If patient discharging to facility then pain control will be continued per facility physician  Ice to operative/injured site for 15-30 minutes of each hour for next 5 days    Recommend that you continue to ice the area 2-3 times per day after this   Elevate operative/injured limb on 2 pillows at home  Goal is to have limb above the heart if able  Continue DVT Prophylaxis (blood clot prevention) as Prescribed: Aspirin 325 mg twice daily as prescribed  Wound care -leave Aquacel dressing in place for 7 days. At 7 days after surgery it is okay to remove your dressing. At this point can shower over top of your incision. Please replace with clean dry dressing after removal    Follow Up in Office in 2 weeks. Your first post op appointment is often with one of our PAs. Call the office at [phone] for directions or with any questions. Watch for these significant complications. Call physician if they or any other problems occur:  Fever over 101°, redness, swelling or warmth at the operative site  Unrelieved nausea    Foul smelling or cloudy drainage at the operative site   Unrelieved pain    Blood soaked dressing. (Some oozing may be normal)     Numb, pale, blue, cold or tingling extremity    No future appointments. It is the Department of Orthopaedic Trauma's standard of practice that providers will de-escalate(wean) all patients from narcotic(opioid) medications during the post-operative period. We provide multimodal pain control but opioid medications are tapered in all of our patients.   If patient requires referral to pain management for prolonged taper off of opioid pain medication we will facilitate this process.

## 2023-02-17 NOTE — OP NOTE
Operative Note      Patient: Lyla Chung  YOB: 1960  MRN: [id number]    Date of Procedure: 2/17/2023    Pre-Op Diagnosis: RIGHT INTERTROCHANTERIC HIP FRACTURE CLOSED    Post-Op Diagnosis: Same       Procedure(s):  HIP OPEN REDUCTION INTERNAL FIXATION-RIGHT    Surgeon(s):  Arslan Pastor DO    Assistant:   Resident: Hill Tomas DO; Tiago Hernandez DO; Elvin Hernandez DO    Anesthesia: General    Estimated Blood Loss (mL): less than 50     Complications: None    Specimens:   * No specimens in log *    Implants:  Implant Name Type Inv. Item Serial No.  Lot No. LRB No. Used Action   NAIL IM L170MM [id number] 125DEG SHT PROX FEM GRN TI-15MO - [id number]  NAIL IM L170MM [id number] NK 125DEG SHT PROX FEM GRN TI-15MO  DEPUY [employer] [id number] Right 1 Implanted   GUIDEWIRE ORTH 3.2X400 MM W/ DRL TIP STRL - [id number]  GUIDEWIRE ORTH 3.2X400 MM W/ DRL TIP STRL  DEPUY [employer]  Right 3 Implanted   BLADE IM L85MM DIA10.35MM PROX FEM G TI CLAUDIA FEN SO FOR - [id number]  BLADE IM L85MM DIA10.35MM PROX FEM G TI CLAUDIA FEN SO FOR  DEPUY [employer]WD [id number] Right 1 Implanted   SCREW BNE LCK 5X30 MM W/ T25 STARDRV FOR IM NAIL TI STRL - [id number]  SCREW BNE LCK 5X30 MM W/ T25 STARDRV FOR IM NAIL TI STRL  DEPUY SYN[employer]WD  Right 1 Implanted         Drains: * No LDAs found *    Findings: See operative report below    Detailed Description of Procedure:   Lyla is a 62-year-old female who sustained a ground-level fall yesterday suffering a minimally displaced right intertrochanteric femur fracture.  She was seen and examined in preoperative holding. Risks, benefits, and alternatives were discussed with the patient and their family. Risks include but are not limited to infection, blood loss, damage to neurovascular and musculoskeletal structures, malunion, nonunion, symptomatic hardware, need for further surgery, possible arthritis despite surgical stabilization, stiffness, and catastrophic  anesthesia complications. They understand and wish to proceed. This is an operative fracture requiring fixation for early mobilization. She understands and wishes to proceed. Informed consent was obtained and placed in chart in preoperative holding. My initials were placed on her right hip. She was rolled to the OR and general anesthesia was induced. She was carefully transferred to the fracture table and boots were placed on bilateral lower extremities. Provisional reduction maneuver was performed including traction internal rotation and abduction. Preoperative images confirmed anatomic reduction of her right intertrochanteric femur fracture. Preoperative antibiotics were infused. The right hip was prepped and draped in standard sterile orthopedic fashion. Appropriate timeout was performed confirming side and site of surgery. 3 cm incision was created centered  along her greater trochanter just proximal to the tip. Dissection was carried down the fascia and the tip of the greater trochanter was identified. Starting guidewire for a Synthes TFN a was inserted through the skin and placed on the tip of the greater trochanter center center position on both AP and lateral.  This was advanced in the proximal femur confirmed on x-ray. Trephine reamer was used to open the proximal femur. I then selected a Synthes TFN a 11 x 170 mm 125 degree nail. This was slid into the proximal femur hole that was created and impacted into the appropriate depth. Position of the nail was confirmed with AP and lateral imaging. Another incision was created over her lateral femur and the trocar for the helical blade was inserted. Guidewire was placed through the trocar and confirmed to be in the center center position on both AP and lateral imaging. This measured 85 mm. Lateral cortex opening reamer was used and then we impacted an 85 mm Synthes TFN a helical blade over our guidewire.   Tip apex distance was appropriate less than 20 mm and confirmed with AP and lateral imaging. This was locked dynamically proximally. A third stab incision was created over the lateral thigh and distal locking screw was placed over a guide. This measured 30 mm. This achieved good purchase and bicortical fixation. At this point the insertion handle for the nail was removed. Final images were taken AP and lateral of the entire nail confirming anatomic reduction and appropriate hardware placement. The wounds were copiously irrigated with sterile saline. Hemostasis was noted be meticulous. The wounds were closed in layered fashion using 0 Vicryl, 2-0 Monocryl, staples. Aquacel dressing was placed. Patient was awakened from anesthesia transferred back to the hospital bed fracture boots were removed. She will be weightbearing as tolerated postoperatively. She will receive DVT prophylaxis in the form of aspirin. She will get 2 doses of postoperative antibiotics and pain control. She can follow-up in my office 2 weeks after surgery.     Electronically signed by Teri Arce DO on 2/17/2023 at 8:36 AM

## 2023-02-17 NOTE — PROGRESS NOTES
[address]          EQEL:                                                   Patient Name: Syed Howard     MRN: [id number]     : 1960     Room: [address] [place] [address]       Evaluating OT: Brian HUTCHINSON, OTR/L, [id number]      Referring Provider: Dc Whitehead MD    Specific Provider Orders/Date: OT eval and treat (23)    Diagnosis: Other fracture of right femur, initial encounter for closed fracture [facility]) [S72.8X1A]  Closed nondisplaced intertrochanteric fracture of right femur, initial encounter ([facility] [address] [G86.046M]    Surgeries/Procedures:  : HIP OPEN REDUCTION INTERNAL FIXATION-RIGHT      Pt admitted with R femur fx. Hx MS      Pertinent Medical History:       has a past medical history of Confusion, Depressive disorder, Diastolic CHF ([facility] [address] Leukopenia, MS (multiple sclerosis) ([facility] [address] Neuromuscular disorder ([facility] [address] and Pneumonia.          Precautions:  Fall Risk, Hx MS, WBAT RLE     Assessment of current deficits    [x] Functional mobility  [x]ADLs  [x] Strength               [x]Cognition    [x] Functional transfers   [x] IADLs         [x] Safety Awareness   [x]Endurance    [] Fine Coordination              [x] Balance      [] Vision/perception   [x]Sensation     []Gross Motor Coordination  [x] ROM  [] Delirium                   [] Motor Control     OT PLAN OF CARE   OT POC based on physician orders, patient diagnosis and results of clinical assessment    Frequency/Duration 1-3 days/wk for 2 weeks PRN   Specific OT Treatment Interventions to include:   * Instruction/training on adapted ADL techniques and AE recommendations to increase functional independence within precautions       * Training on energy conservation strategies, correct breathing pattern and techniques to improve independence/tolerance for self-care routine  * Functional transfer/mobility training/DME recommendations for increased independence, safety, and fall prevention  * Patient/Family education to increase follow through with safety techniques and functional independence  * Recommendation of environmental modifications for increased safety with functional transfers/mobility and ADLs  * Cognitive retraining/development of therapeutic activities to improve problem solving, judgement, memory, and attention for increased safety/participation in ADL/IADL tasks  * Therapeutic exercise to improve motor endurance, ROM, and functional strength for ADLs/functional transfers  * Therapeutic activities to facilitate/challenge dynamic balance, stand tolerance for increased safety and independence with ADLs  * Therapeutic activities to facilitate gross/fine motor skills for increased independence with ADLs  * Positioning to improve skin integrity, interaction with environment and functional independence  * Delirium prevention/treatment    Recommended Adaptive Equipment/DME: LB AE, TBD     Home Living: Pt lives with mother (who was recently admitted to SNF) in 2 story home with chair lift and bed and bath on 2nd level. Bathroom setup: walk-in shower to be installed with built in shower chair and grab bars   Equipment owned: FWW/WC    Prior Level of Function: Ind with ADLs , Ind with IADLs; Used FWW for functional mobility. Pt reports 5 falls within last month. Pt states falls occur when she turns with FWW.   Driving: No  Occupation: None stated    Pain Level: 7/10; in R hip  Cognition: A&O: 4/4; Follows 2 step directions   Memory: G-   Sequencing: G-   Problem solving:  F   Judgement/safety:  F    Vitals Assessed: NT  SpO2:      BP:      Functional Assessment:  AM-PAC Daily Activity Raw Score: 16/24   Initial Eval Status  Date: 2/17/23 Treatment Status  Date: STGs = LTGs  Time frame: 10-14 days   Feeding Independent         Grooming Stand by Assist     Independent    UB Dressing Minimal Assist     Independent LB Dressing Maximal Assist     Modified [place]    Bathing Moderate Assist    Modified [place]    Toileting Moderate Assist   Posterior hygiene in standing  Modified [place]    Bed Mobility  Supine to sit: Moderate Assist   Sit to supine: Maximal Assist     Supine to sit: Modified [place]   Sit to supine: Modified [place]    Functional Transfers Sit to stand: Moderate Assist  Stand to sit: Moderate Assist  Stand pivot: NT     Modified [place]    Functional Mobility Moderate Assist with FWW  For side steps at EOB    Modified [place]    Balance Sitting:     Static: S    Dynamic:Min A  Standing: Mod A    during functional activity  Sitting:     Static:  Ind    Dynamic:Ind  Standing: Mod I   Activity Tolerance Fair+ with light activity  WFL  For full ADL/IADL tasks   Visual/  Perceptual Glasses: Yes        Safety F  G-     Hand Dominance: R   AROM (PROM) Strength Additional Info:    RUE  WFL Grossly 4/5 Good  and wfl FMC/dexterity noted during ADL tasks       LUE WFL Grossly 4/5 Good  and wfl FMC/dexterity noted during ADL tasks       Hearing: WFL   Sensation:  C/o numbness/tingling in hands/feet  Tone: WFL   Edema: Unremarkable    Comments: Patient cleared by nursing. Upon arrival patient semi supine in bed, educated on the role of OT, and agreeable to OT session. No family present for session today. OT facilitated ADLs, bed mobility, functional transfers with focus on safety, body mechanics, and precautions. At end of session, patient semi supine in bed, properly positioned, oriented to call light, with call light to R side and phone within reach, all lines and tubes intact. Pt motivated to participate in therapy. Nursing notified. Overall patient demonstrated fair reception to education, decreased independence and safety during completion of ADL/functional transfer/mobility tasks.   Pt would benefit from continued skilled OT to increase safety and independence with completion of ADL/IADL tasks for functional independence and quality of life. Treatment: OT treatment provided this date includes:   Instruction/training on safety and adapted techniques for completion of ADLs: Pt educated on LB AE to increase independence in self-care. Instruction/training on safe functional mobility/transfer techniques: with focus on safety, body mechanics, and precautions   Proper Positioning/Alignment: for optimal healing, skin integrity, to prevent breakdown, decrease edema, and reduce risk of contracture. Skilled Monitoring of Vitals: to include BP, spO2, and HR throughout session to maximize safety. Sitting/Standing Balance/Tolerance: to increase balance and activity tolerance during ADLs and facilitate proper posture and positioning. Therapeutic activity: to challenge dynamic sitting/standing balance and endurance to promote safety during ADL tasks and functional transfers and mobility. Rehab Potential: Good for established goals     Patient / Family Goal: to return home at [facility]    Patient and/or family were instructed on functional diagnosis, prognosis/goals and OT plan of care. Demonstrated fair understanding. Eval Complexity: Low    Time In: 5878  Time Out: 1500  Total Treatment Time: 8 min    Min Units   OT Eval Low 58011 x      OT Eval Medium 60014      OT Eval High 32051      OT Re-Eval [id number]       Therapeutic Ex 41957       Therapeutic Activities 12897       ADL/Self Care 51728 8  1    Orthotic Management 54763       Manual 20454     Neuro Re-Ed 52357       Non-Billable Time          Evaluation Time additionally includes thorough review of current medical information, gathering information on past medical history/social history and prior level of function, interpretation of standardized testing/informal observation of tasks, assessment of data and development of plan of care and goals.             Presley Hatchet, OTD,  OTR/L; [id number]

## 2023-02-17 NOTE — PROGRESS NOTES
Physical Therapy Initial Assessment     Name: Sayda Soliz  : 1960  MRN: [id number]      Date of Service: 2023    Evaluating PT:  Marli Downs, PT, DPT [id number]    Room #:  0364/3504-K  Diagnosis:  Other fracture of right femur, initial encounter for closed fracture [facility]) [S72.8X1A]  Closed nondisplaced intertrochanteric fracture of right femur, initial encounter [facility]) Eliezer Jeronimo  PMHx/PSHx:    Past Medical History:   Diagnosis Date    Confusion 2015    Depressive disorder 3/01/1637    Diastolic CHF ([facility] 6862    Leukopenia 2015    MS (multiple sclerosis) ([facility]     Neuromuscular disorder ([facility]     Pneumonia      Procedure/Surgery:  : HIP OPEN REDUCTION INTERNAL FIXATION-RIGHT  Reason for admission: Fracture of femur, intertrochanteric, closed, right, initial encounter ([facility]  Precautions:  Fall Risk, Hx MS, WBAT RLE  Equipment Needs:  TBD    SUBJECTIVE:  Pt lives with mother (who was recently admitted to SNF) in 2 story home with chair lift and bed and bath on 2nd level, 3 KADIE home. Pt ambulated with FWW PTA. Pt reports 5 falls within last month. Pt states falls occur when she turns with FWW. OBJECTIVE:   Initial Evaluation  Date: 23 Treatment Short Term/ Long Term   Goals   AM-PAC 6 Clicks 76/69     Was pt agreeable to Eval/treatment? Yes     Does pt have pain?  7/10 in R hip     Bed Mobility  Rolling: min a  Supine to sit: mod a  Sit to supine: max a  Scooting: min a  Rolling: ind  Supine to sit: ind  Sit to supine: ind  Scooting: ind   Transfers Sit to stand: mod a  Stand to sit: mod a  Stand pivot: NT  Sit to stand: ind  Stand to sit: ind  Stand pivot: ind   Ambulation   2 ft side stepping to HOB mod A FWW  150 feet with AAD ind   Stair negotiation: ascended and descended NT  3 steps with one rail ind   ROM BUE:  Refer to OT eval   BLE:  WNL     Strength BUE:  Refer to OT eval   BLE: LLE WNL, RLE 4/5 (R flexion limited d/t pain)  5/5 globally   Balance Sitting EOB: SBA  Dynamic Standing:  mod A FWW  Sitting EOB:  ind  Dynamic Standing:  ind     Pt is A & O x 4  Sensation:  Pt notes n/t at baseline to RLE  Edema:  none noted     Vitals:  Blood Pressure at rest -- Blood Pressure post session --   Heart Rate at rest 75 BPM  Heart Rate post session --   SPO2 at rest -- SPO2 post session --     Therapeutic Exercises:  none performed this visit    Patient education  Pt educated on importance of OOB activity, WBAT restrictions    Patient response to education:   Pt verbalized understanding Pt demonstrated skill Pt requires further education in this area   x x x     ASSESSMENT:    Conditions Requiring Skilled Therapeutic Intervention:    [x]Decreased strength     []Decreased ROM  [x]Decreased functional mobility  [x]Decreased balance   [x]Decreased endurance   []Decreased posture  [x]Decreased sensation  []Decreased coordination   []Decreased vision  []Decreased safety awareness   [x]Increased pain       Comments:  Pt in bed on arrival, pleasant and motivated to participate in PT IE. Pt slightly impulsive at times and required cues to increase safety with activities today. No lightheadedness, dizziness noted sitting EOB. Pt performed STS x2 at EOB to [address], able to take small side steps with mod A for balance/weight shift with [employer] on second attempt. Pt attempted to return to bed impulsively, required assist for safe transfer. Pt positioned for comfort and left with RN present. Pt will benefit from further skilled PT tx prior to returning home to improve safety and independence with mobility. Treatment:  Patient practiced and was instructed in the following treatment:    Bed mobility: performed with cues for safety awareness and proper hand placement to promote improved functional independence. Transfer Training: STS x2 at EOB to [address]   Gait training:  side stepping with FWW at EOB     Pt's/ family goals   1. Return home safely.     Prognosis is good for reaching above PT goals. Patient and or family understand(s) diagnosis, prognosis, and plan of care. yes    PHYSICAL THERAPY PLAN OF CARE:    PT POC is established based on physician order and patient diagnosis     Referring provider/PT Order:    02/17/23 1345  PT eval and treat  Start:  02/17/23 1345,   End:  02/17/23 1345,   ONE TIME,   Standing Count:  1 Occurrences,   R        Order went unreviewed at transfer on Fri Feb 17, 2023  2:27 PM    Shantell Hernandez MD     Diagnosis:  Other fracture of right femur, initial encounter for closed fracture [facility]) [S72.8X1A]  Closed nondisplaced intertrochanteric fracture of right femur, [facility] [S72.144A]  Specific instructions for next treatment:  increase activity as able    Current Treatment Recommendations:   [x] Strengthening to improve independence with functional mobility   [] ROM to improve independence with functional mobility   [x] Balance Training to improve static/dynamic balance and to reduce fall risk  [x] Endurance Training to improve activity tolerance during functional mobility   [x] Transfer Training to improve safety and independence with all functional transfers   [x] Gait Training to improve gait mechanics, endurance and assess need for appropriate assistive device  [x] Stair Training in preparation for safe discharge home and/or into the community   [] Positioning to prevent skin breakdown and contractures  [] Safety and Education Training   [] Patient/Caregiver Education   [] HEP  [] Other     PT long term treatment goals are located in above grid    Frequency of treatments: 2-5x/week x 1-2 weeks.     Time in  1437  Time out  1500    Total Treatment Time  10 minutes     Evaluation Time includes thorough review of current medical information, gathering information on past medical history/social history and prior level of function, completion of standardized testing/informal observation of tasks, assessment of data and education on plan of care and goals.    CPT codes:  [x] Low Complexity PT evaluation 99647  [] Moderate Complexity PT evaluation 96576  [] High Complexity PT evaluation 49320  [] PT Re-evaluation 22058  [] Gait training 13020 -- minutes  [] Manual therapy 71737 -- minutes  [x] Therapeutic activities 20784 10 minutes  [] Therapeutic exercises 89527 -- minutes  [] Neuromuscular reeducation 24535 -- minutes     Jose Alfredo Carney, PT, DPT  [id number]

## 2023-02-17 NOTE — ANESTHESIA POSTPROCEDURE EVALUATION
Department of Anesthesiology  Postprocedure Note    Patient: Valinda Holstein  MRN: [id number]  YOB: 1960  Date of evaluation: 2/17/2023      Procedure Summary     Date: 02/17/23 Room / Location: [facility] OR 08 / [facility]    Anesthesia Start: 2542 Anesthesia Stop: 9332    Procedure: HIP OPEN REDUCTION INTERNAL FIXATION-RIGHT (Right: Hip) Diagnosis:       Closed intertrochanteric fracture of hip, right, [facility]      (RIGHT INTERTROCHANTERIC HIP FRACTURE CLOSED)    Surgeons: Mya Dunn DO Responsible Provider: Rian Dubon MD    Anesthesia Type: general ASA Status: 4 - Emergent          Anesthesia Type: No value filed.     Mona Phase I: Mona Score: 10    Mona Phase II:        Anesthesia Post Evaluation    Patient location during evaluation: PACU  Patient participation: complete - patient participated  Level of consciousness: awake and alert  Airway patency: patent  Nausea & Vomiting: no nausea and no vomiting  Complications: no  Cardiovascular status: hemodynamically stable  Respiratory status: acceptable  Hydration status: euvolemic  Multimodal analgesia pain management approach

## 2023-02-17 NOTE — PROGRESS NOTES
Hospitalist Progress Note      PCP: Alyssa Jiang MD    Date of Admission: 2/16/2023    Chief Complaint: fall, right femur intertroch fracture    Hospital Course:    Patient presented to the emergency department after falling at home. Now with complaints of right hip and leg pain. Denies losing consciousness or hitting her head. Patient denies any chest pain, fever, chills, nausea, vomiting, headache, dizziness/lightheadedness, abdominal pain. Vital signs are within normal limits and stable although she is somewhat hypertensive with pressure 173/94. Laboratory studies were unremarkable. Imaging revealed a right proximal femur intertrochanteric nondisplaced fracture. Orthopedic service was consulted. Medicine consulted for admission. Subjective: Patient was seen after the surgery in the PACU. Alert, following commands, mentions that her pain is well controlled at this time. Does not have any acute concerns. Discussed about the plan regarding monitoring hemoglobin, PT/OT.   All questions were answered at this time      Medications:  Reviewed    Infusion Medications    sodium chloride      sodium chloride       Scheduled Medications    amantadine  100 mg Oral BID    baclofen  10 mg Oral TID    buPROPion  150 mg Oral BID    levothyroxine  50 mcg Oral Daily    losartan  50 mg Oral Daily    sodium chloride flush  10 mL IntraVENous 2 times per day    modafinil  200 mg Oral BID    ceFAZolin  2,000 mg IntraVENous Q8H    sodium chloride flush  5-40 mL IntraVENous 2 times per day    gabapentin  100 mg Oral TID    methocarbamol  750 mg Oral 4x Daily     PRN Meds: oxyCODONE-acetaminophen, fentanNYL, sodium chloride flush, sodium chloride, promethazine **OR** ondansetron, polyethylene glycol, acetaminophen **OR** acetaminophen, sodium chloride flush, sodium chloride, meperidine, diphenhydrAMINE, HYDROmorphone, HYDROmorphone      Intake/Output Summary (Last 24 hours) at 2/17/2023 2750  Last data filed at 2/17/2023 1051  Gross per 24 hour   Intake 700 ml   Output 160 ml   Net 540 ml       Exam:    /67   Pulse 74   Temp 97.2 °F (36.2 °C)   Resp 17   Ht 4' 11\" (1.499 m)   Wt 95 lb (43.1 kg)   SpO2 100%   BMI 19.19 kg/m²     General appearance: No apparent distress, appears stated age and cooperative. HEENT: Pupils equal, round, and reactive to light. Conjunctivae/corneas clear. Neck: Supple, with full range of motion. No jugular venous distention. Trachea midline. Respiratory:  Normal respiratory effort. Clear to auscultation, bilaterally without Rales/Wheezes/Rhonchi. Cardiovascular: Regular rate and rhythm with normal S1/S2 without murmurs, rubs or gallops. Abdomen: Soft, non-tender, non-distended with normal bowel sounds. Musculoskeletal: No clubbing, cyanosis or edema bilaterally. Decreased range of motion in the right hip secondary to pain  Skin: Skin color, texture, turgor normal.  No rashes or lesions. Dressings intact  Neurologic: No acute focal deficits  Psychiatric: Alert and oriented, thought content appropriate, normal insight    Labs:   Recent Labs     02/16/23  1652 02/17/23  0552   WBC 8.3 6.8   HGB 9.3* 10.8*   HCT 28.3* 32.9*    216     Recent Labs     02/16/23  1835 02/17/23  0552    138   K 4.2 4.9    106   CO2 24 26   BUN 11 10   CREATININE 0.7 0.7   CALCIUM 8.8 7.9*     Recent Labs     02/16/23  1835 02/17/23  0552   AST 19 20   ALT 12 10   BILITOT 0.3 0.4   ALKPHOS 101 82     Recent Labs     02/17/23  0025   INR 1.1     No results for input(s): Anandramakrishna Aguirre in the last 72 hours.     Assessment/Plan:    Active Hospital Problems    Diagnosis Date Noted    Fracture of femur, intertrochanteric, closed, right, initial encounter [facility]) Darcy Ganser 02/17/2023     Priority: Medium    Other fracture of right femur, [facility]     Priority: Medium   -Right femur intertrochanteric fracture  -Fall at home  -Hypertension  -Hyperlipidemia  -Hypothyroidism  -History of multiple sclerosis        PLAN:  -Consulted orthopedic service-status post right hip ORIF-2/17/2023  -Pain management  -PT/OT consulted  -Normal saline 75 mL/h  -Hold anticoagulants, restart when okay per surgery  -Continue home medications      DVT Prophylaxis: When okay per surgery  Diet: Diet NPO  Code Status: Full Code    PT/OT Eval Status: Consulted    Dispo -status post ORIF, PT/OT, monitoring hemoglobin, pain management    Magdiel Chisholm MD

## 2023-02-17 NOTE — PROGRESS NOTES
ALL PT. POSSESSIONS RECEIVED WITH PT. FROM ER IN A CLEAR PLASTIC BAG LABELED WITH PT. LABELS, SENT TO PACU WITH PT. POST PROCEDURE.

## 2023-02-18 LAB
ANION GAP SERPL CALCULATED.3IONS-SCNC: 9 MMOL/L (ref 7–16)
BASOPHILIC STIPPLING: ABNORMAL
BASOPHILS ABSOLUTE: 0 E9/L (ref 0–0.2)
BASOPHILS RELATIVE PERCENT: 0.2 % (ref 0–2)
BUN BLDV-MCNC: 17 MG/DL (ref 6–23)
CALCIUM SERPL-MCNC: 8.9 MG/DL (ref 8.6–10.2)
CHLORIDE BLD-SCNC: 105 MMOL/L (ref 98–107)
CO2: 26 MMOL/L (ref 22–29)
CREAT SERPL-MCNC: 0.9 MG/DL (ref 0.5–1)
EOSINOPHILS ABSOLUTE: 0 E9/L (ref 0.05–0.5)
EOSINOPHILS RELATIVE PERCENT: 0.1 % (ref 0–6)
GFR SERPL CREATININE-BSD FRML MDRD: >60 ML/MIN/1.73
GLUCOSE BLD-MCNC: 92 MG/DL (ref 74–99)
HCT VFR BLD CALC: 29.1 % (ref 34–48)
HEMOGLOBIN: 9.8 G/DL (ref 11.5–15.5)
HYPOCHROMIA: ABNORMAL
LYMPHOCYTES ABSOLUTE: 0.41 E9/L (ref 1.5–4)
LYMPHOCYTES RELATIVE PERCENT: 2.6 % (ref 20–42)
MCH RBC QN AUTO: 30.7 PG (ref 26–35)
MCHC RBC AUTO-ENTMCNC: 33.7 % (ref 32–34.5)
MCV RBC AUTO: 91.2 FL (ref 80–99.9)
MONOCYTES ABSOLUTE: 1.09 E9/L (ref 0.1–0.95)
MONOCYTES RELATIVE PERCENT: 7.9 % (ref 2–12)
NEUTROPHILS ABSOLUTE: 12.24 E9/L (ref 1.8–7.3)
NEUTROPHILS RELATIVE PERCENT: 89.5 % (ref 43–80)
OVALOCYTES: ABNORMAL
PDW BLD-RTO: 12.8 FL (ref 11.5–15)
PLATELET # BLD: 250 E9/L (ref 130–450)
PMV BLD AUTO: 9.2 FL (ref 7–12)
POIKILOCYTES: ABNORMAL
POTASSIUM REFLEX MAGNESIUM: 4.4 MMOL/L (ref 3.5–5)
RBC # BLD: 3.19 E12/L (ref 3.5–5.5)
SODIUM BLD-SCNC: 140 MMOL/L (ref 132–146)
WBC # BLD: 13.6 E9/L (ref 4.5–11.5)

## 2023-02-18 PROCEDURE — 80048 BASIC METABOLIC PNL TOTAL CA: CPT

## 2023-02-18 PROCEDURE — 6370000000 HC RX 637 (ALT 250 FOR IP): Performed by: STUDENT IN AN ORGANIZED HEALTH CARE EDUCATION/TRAINING PROGRAM

## 2023-02-18 PROCEDURE — 85025 COMPLETE CBC W/AUTO DIFF WBC: CPT

## 2023-02-18 PROCEDURE — 36415 COLL VENOUS BLD VENIPUNCTURE: CPT

## 2023-02-18 PROCEDURE — 1200000000 HC SEMI PRIVATE

## 2023-02-18 PROCEDURE — 2580000003 HC RX 258: Performed by: STUDENT IN AN ORGANIZED HEALTH CARE EDUCATION/TRAINING PROGRAM

## 2023-02-18 PROCEDURE — 6360000002 HC RX W HCPCS: Performed by: STUDENT IN AN ORGANIZED HEALTH CARE EDUCATION/TRAINING PROGRAM

## 2023-02-18 RX ORDER — BISACODYL 10 MG
10 SUPPOSITORY, RECTAL RECTAL DAILY PRN
Status: DISCONTINUED | OUTPATIENT
Start: 2023-02-18 | End: 2023-02-21 | Stop reason: HOSPADM

## 2023-02-18 RX ADMIN — ASPIRIN 81 MG: 81 TABLET, FILM COATED ORAL at 20:12

## 2023-02-18 RX ADMIN — Medication 10 ML: at 08:21

## 2023-02-18 RX ADMIN — BACLOFEN 10 MG: 10 TABLET ORAL at 08:17

## 2023-02-18 RX ADMIN — GABAPENTIN 100 MG: 100 CAPSULE ORAL at 20:12

## 2023-02-18 RX ADMIN — ASPIRIN 81 MG: 81 TABLET, FILM COATED ORAL at 08:17

## 2023-02-18 RX ADMIN — FENTANYL CITRATE 50 MCG: 0.05 INJECTION, SOLUTION INTRAMUSCULAR; INTRAVENOUS at 17:51

## 2023-02-18 RX ADMIN — BACLOFEN 10 MG: 10 TABLET ORAL at 20:12

## 2023-02-18 RX ADMIN — BUPROPION HYDROCHLORIDE 150 MG: 150 TABLET, EXTENDED RELEASE ORAL at 08:16

## 2023-02-18 RX ADMIN — MODAFINIL 200 MG: 100 TABLET ORAL at 20:13

## 2023-02-18 RX ADMIN — GABAPENTIN 100 MG: 100 CAPSULE ORAL at 08:17

## 2023-02-18 RX ADMIN — GABAPENTIN 100 MG: 100 CAPSULE ORAL at 13:31

## 2023-02-18 RX ADMIN — LOSARTAN POTASSIUM 50 MG: 50 TABLET, FILM COATED ORAL at 08:17

## 2023-02-18 RX ADMIN — BUPROPION HYDROCHLORIDE 150 MG: 150 TABLET, EXTENDED RELEASE ORAL at 20:12

## 2023-02-18 RX ADMIN — MODAFINIL 200 MG: 100 TABLET ORAL at 08:17

## 2023-02-18 RX ADMIN — BACLOFEN 10 MG: 10 TABLET ORAL at 13:31

## 2023-02-18 RX ADMIN — AMANTADINE HYDROCHLORIDE 100 MG: 100 CAPSULE ORAL at 08:16

## 2023-02-18 RX ADMIN — LEVOTHYROXINE SODIUM 50 MCG: 0.1 TABLET ORAL at 05:15

## 2023-02-18 RX ADMIN — AMANTADINE HYDROCHLORIDE 100 MG: 100 CAPSULE ORAL at 20:12

## 2023-02-18 RX ADMIN — POLYETHYLENE GLYCOL 3350 17 G: 17 POWDER, FOR SOLUTION ORAL at 13:31

## 2023-02-18 RX ADMIN — OXYCODONE AND ACETAMINOPHEN 1 TABLET: 5; 325 TABLET ORAL at 12:25

## 2023-02-18 RX ADMIN — Medication 10 ML: at 20:13

## 2023-02-18 ASSESSMENT — PAIN SCALES - GENERAL
PAINLEVEL_OUTOF10: 7
PAINLEVEL_OUTOF10: 1
PAINLEVEL_OUTOF10: 1
PAINLEVEL_OUTOF10: 0
PAINLEVEL_OUTOF10: 0
PAINLEVEL_OUTOF10: 4

## 2023-02-18 ASSESSMENT — PAIN DESCRIPTION - DESCRIPTORS
DESCRIPTORS: ACHING;DISCOMFORT;DULL;SHOOTING
DESCRIPTORS: ACHING;DISCOMFORT;DULL;SHARP

## 2023-02-18 ASSESSMENT — PAIN SCALES - WONG BAKER
WONGBAKER_NUMERICALRESPONSE: 0
WONGBAKER_NUMERICALRESPONSE: 0

## 2023-02-18 ASSESSMENT — PAIN DESCRIPTION - LOCATION
LOCATION: HIP
LOCATION: HIP

## 2023-02-18 NOTE — PROGRESS NOTES
I have seen and evaluated the patient and agree with the above assessment on today's visit. I have performed the key components of the history and physical examination and concur completely with the findings and plans as documented. Agree with ROS, examination, FMH, PMH, PSH, SocHx, and allergies as above. Patient seen and examined at bedside this morning. She is doing very well. Her pain is well controlled and she is very happy with her results. She is excited to get up and moving with physical therapy. right lower extremity:  Dressing C/D/I  Compartments soft and compressible, calf non-tender  Palpable dorsalis pedis and posterior tibialis pulse, brisk cap refill to toes, foot warm and perfused  Sensation intact to light touch in sural/deep peroneal/superficial peroneal/saphenous/posterior tibial nerve distributions to foot/ankle. Demonstrates active ankle plantar/dorsiflexion/great toe extension    Plan: Agree with resident's assessment and plan below. She can be weightbearing as tolerated. She should continue DVT prophylaxis. We will see how she does with therapy. Hopefully we can get her home sooner rather than later. She is happy with the results. All of her questions answered in detail. Mendel Docker DO   Orthopaedic Surgery   2/18/2023  10:05 AM        Department of Orthopedic Surgery   Progress Note    POD 1 right intertrochanteric ORIF patient seen and examined. Pain controlled. Was able to take few steps yesterday after surgery. Plan on working with physical therapy more today. Reports some numbness and tingling in all 4 extremities secondary to MS. Denies chest pain, shortness of breath, calf pain, dizziness/lightheadedness, fevers or chills.      VITALS:  /63   Pulse 66   Temp 98.6 °F (37 °C)   Resp 16   Ht 4' 11\" (1.499 m)   Wt 95 lb (43.1 kg)   SpO2 98%   BMI 19.19 kg/m²     GENERAL: In bed, comfortable, oriented x4  MUSCULOSKELETAL:   right lower extremity:  Dressing C/D/I  Compartments soft and compressible, calf non-tender  Palpable dorsalis pedis and posterior tibialis pulse, brisk cap refill to toes, foot warm and perfused  Sensation intact to light touch in sural/deep peroneal/superficial peroneal/saphenous/posterior tibial nerve distributions to foot/ankle. Demonstrates active ankle plantar/dorsiflexion/great toe extension    CBC:   Lab Results   Component Value Date/Time    WBC 6.8 02/17/2023 05:52 AM    HGB 10.8 02/17/2023 05:52 AM    HCT 32.9 02/17/2023 05:52 AM     02/17/2023 05:52 AM       ASSESSMENT  S/p right intertrochanteric ORIF 2/17    PLAN    Continue physical therapy and protocol: Weightbearing as tolerated right lower extremity  24 hour abx coverage to be completed today  Deep venous thrombosis prophylaxis -per medicine recommendations. Okay to resume postop day 1, early mobilization  PT/OT fall precautions  Pain Control: IV and PO  Monitor H&H 10.8 on 2/17  D/C Plan:  pending sw/cm and therapy recommendations.  Ok to discharge from orthopedic standpoint once appropriate discharge plan is in place     Electronically signed by Leroy Kate DO on 2/18/2023 at 5:45 AM

## 2023-02-18 NOTE — PROGRESS NOTES
Hospitalist Progress Note      PCP: Jovani Simpson MD    Date of Admission: 2/16/2023    Chief Complaint: fall, right femur intertroch fracture    Hospital Course:    Patient presented to the emergency department after falling at home. Now with complaints of right hip and leg pain. Denies losing consciousness or hitting her head. Patient denies any chest pain, fever, chills, nausea, vomiting, headache, dizziness/lightheadedness, abdominal pain. Vital signs are within normal limits and stable although she is somewhat hypertensive with pressure 173/94. Laboratory studies were unremarkable. Imaging revealed a right proximal femur intertrochanteric nondisplaced fracture. Orthopedic service was consulted. Medicine consulted for admission. Subjective: Patient was seen at bedside. Alert, following commands, mentions that her pain is very well controlled at this time. Does not have any acute concerns. Discussed about the plan regarding monitoring hemoglobin, PT/OT.   All questions were answered at this time      Medications:  Reviewed    Infusion Medications    sodium chloride       Scheduled Medications    amantadine  100 mg Oral BID    baclofen  10 mg Oral TID    buPROPion  150 mg Oral BID    levothyroxine  50 mcg Oral Daily    losartan  50 mg Oral Daily    sodium chloride flush  10 mL IntraVENous 2 times per day    modafinil  200 mg Oral BID    aspirin  81 mg Oral BID    gabapentin  100 mg Oral TID     PRN Meds: oxyCODONE-acetaminophen, fentanNYL, sodium chloride flush, sodium chloride, promethazine **OR** ondansetron, polyethylene glycol, acetaminophen **OR** acetaminophen      Intake/Output Summary (Last 24 hours) at 2/18/2023 0953  Last data filed at 2/18/2023 0926  Gross per 24 hour   Intake 480 ml   Output 500 ml   Net -20 ml       Exam:    BP (!) 151/75   Pulse 76   Temp 98.2 °F (36.8 °C) (Oral)   Resp 18   Ht 4' 11\" (1.499 m)   Wt 95 lb (43.1 kg)   SpO2 98%   BMI 19.19 kg/m² General appearance: No apparent distress, appears stated age and cooperative. HEENT: Pupils equal, round, and reactive to light. Conjunctivae/corneas clear. Neck: Supple, with full range of motion. No jugular venous distention. Trachea midline. Respiratory:  Normal respiratory effort. Clear to auscultation, bilaterally without Rales/Wheezes/Rhonchi. Cardiovascular: Regular rate and rhythm with normal S1/S2 without murmurs, rubs or gallops. Abdomen: Soft, non-tender, non-distended with normal bowel sounds. Musculoskeletal: No clubbing, cyanosis or edema bilaterally. Decreased range of motion in the right hip secondary to pain  Skin: Skin color, texture, turgor normal.  No rashes or lesions. Dressings intact  Neurologic: No acute focal deficits  Psychiatric: Alert and oriented, thought content appropriate, normal insight    Labs:   Recent Labs     02/16/23  1652 02/17/23  0552 02/18/23  0916   WBC 8.3 6.8 13.6*   HGB 9.3* 10.8* 9.8*   HCT 28.3* 32.9* 29.1*    216 250     Recent Labs     02/16/23  1835 02/17/23  0552    138   K 4.2 4.9    106   CO2 24 26   BUN 11 10   CREATININE 0.7 0.7   CALCIUM 8.8 7.9*     Recent Labs     02/16/23  1835 02/17/23  0552   AST 19 20   ALT 12 10   BILITOT 0.3 0.4   ALKPHOS 101 82     Recent Labs     02/17/23  0025   INR 1.1     No results for input(s): Charly Guerrero in the last 72 hours.     Assessment/Plan:    Active Hospital Problems    Diagnosis Date Noted    Fracture of femur, intertrochanteric, closed, right, initial encounter [facility]) Lien Armstrong 02/17/2023     Priority: Medium    Other fracture of right femur, initial encounter for closed fracture [facility]) [S72.8X1A] 02/16/2023     Priority: Medium   -Right femur intertrochanteric fracture  -Fall at home  -Hypertension  -Hyperlipidemia  -Hypothyroidism  -History of multiple sclerosis        PLAN:  -Consulted orthopedic service-status post right hip ORIF-2/17/2023  -Pain management  -PT/OT consulted  - post op hb- 9.8, will monitor , preop-10.8  -Normal saline 75 mL/h  -Hold anticoagulants, restart when okay per surgery  -Continue home medications      DVT Prophylaxis: When okay per surgery  Diet: ADULT DIET;  Regular  Code Status: Full Code    PT/OT Eval Status: Consulted    Dispo -status post ORIF, PT/OT, monitoring hemoglobin, pain management    Andreas Villeda MD

## 2023-02-19 LAB
ANION GAP SERPL CALCULATED.3IONS-SCNC: 11 MMOL/L (ref 7–16)
BASOPHILS ABSOLUTE: 0.04 E9/L (ref 0–0.2)
BASOPHILS RELATIVE PERCENT: 0.5 % (ref 0–2)
BUN BLDV-MCNC: 19 MG/DL (ref 6–23)
CALCIUM SERPL-MCNC: 9.2 MG/DL (ref 8.6–10.2)
CHLORIDE BLD-SCNC: 99 MMOL/L (ref 98–107)
CO2: 27 MMOL/L (ref 22–29)
CREAT SERPL-MCNC: 0.7 MG/DL (ref 0.5–1)
EOSINOPHILS ABSOLUTE: 0.08 E9/L (ref 0.05–0.5)
EOSINOPHILS RELATIVE PERCENT: 0.9 % (ref 0–6)
GFR SERPL CREATININE-BSD FRML MDRD: >60 ML/MIN/1.73
GLUCOSE BLD-MCNC: 83 MG/DL (ref 74–99)
HCT VFR BLD CALC: 31.5 % (ref 34–48)
HEMOGLOBIN: 10.1 G/DL (ref 11.5–15.5)
IMMATURE GRANULOCYTES #: 0.05 E9/L
IMMATURE GRANULOCYTES %: 0.6 % (ref 0–5)
LYMPHOCYTES ABSOLUTE: 1.01 E9/L (ref 1.5–4)
LYMPHOCYTES RELATIVE PERCENT: 11.8 % (ref 20–42)
MCH RBC QN AUTO: 30.7 PG (ref 26–35)
MCHC RBC AUTO-ENTMCNC: 32.1 % (ref 32–34.5)
MCV RBC AUTO: 95.7 FL (ref 80–99.9)
MONOCYTES ABSOLUTE: 1.28 E9/L (ref 0.1–0.95)
MONOCYTES RELATIVE PERCENT: 14.9 % (ref 2–12)
NEUTROPHILS ABSOLUTE: 6.11 E9/L (ref 1.8–7.3)
NEUTROPHILS RELATIVE PERCENT: 71.3 % (ref 43–80)
PDW BLD-RTO: 12.6 FL (ref 11.5–15)
PLATELET # BLD: 216 E9/L (ref 130–450)
PMV BLD AUTO: 9.2 FL (ref 7–12)
POTASSIUM REFLEX MAGNESIUM: 5 MMOL/L (ref 3.5–5)
RBC # BLD: 3.29 E12/L (ref 3.5–5.5)
SODIUM BLD-SCNC: 137 MMOL/L (ref 132–146)
WBC # BLD: 8.6 E9/L (ref 4.5–11.5)

## 2023-02-19 PROCEDURE — 6370000000 HC RX 637 (ALT 250 FOR IP): Performed by: STUDENT IN AN ORGANIZED HEALTH CARE EDUCATION/TRAINING PROGRAM

## 2023-02-19 PROCEDURE — 80048 BASIC METABOLIC PNL TOTAL CA: CPT

## 2023-02-19 PROCEDURE — 2580000003 HC RX 258: Performed by: STUDENT IN AN ORGANIZED HEALTH CARE EDUCATION/TRAINING PROGRAM

## 2023-02-19 PROCEDURE — 1200000000 HC SEMI PRIVATE

## 2023-02-19 PROCEDURE — 85025 COMPLETE CBC W/AUTO DIFF WBC: CPT

## 2023-02-19 PROCEDURE — 36415 COLL VENOUS BLD VENIPUNCTURE: CPT

## 2023-02-19 PROCEDURE — 6370000000 HC RX 637 (ALT 250 FOR IP): Performed by: INTERNAL MEDICINE

## 2023-02-19 RX ADMIN — BUPROPION HYDROCHLORIDE 150 MG: 150 TABLET, EXTENDED RELEASE ORAL at 22:47

## 2023-02-19 RX ADMIN — AMANTADINE HYDROCHLORIDE 100 MG: 100 CAPSULE ORAL at 08:00

## 2023-02-19 RX ADMIN — ASPIRIN 81 MG: 81 TABLET, FILM COATED ORAL at 07:56

## 2023-02-19 RX ADMIN — Medication 10 ML: at 20:29

## 2023-02-19 RX ADMIN — BISACODYL 10 MG: 10 SUPPOSITORY RECTAL at 00:49

## 2023-02-19 RX ADMIN — LOSARTAN POTASSIUM 50 MG: 50 TABLET, FILM COATED ORAL at 07:57

## 2023-02-19 RX ADMIN — GABAPENTIN 100 MG: 100 CAPSULE ORAL at 20:29

## 2023-02-19 RX ADMIN — BACLOFEN 10 MG: 10 TABLET ORAL at 07:56

## 2023-02-19 RX ADMIN — GABAPENTIN 100 MG: 100 CAPSULE ORAL at 07:57

## 2023-02-19 RX ADMIN — GABAPENTIN 100 MG: 100 CAPSULE ORAL at 14:03

## 2023-02-19 RX ADMIN — OXYCODONE AND ACETAMINOPHEN 1 TABLET: 5; 325 TABLET ORAL at 01:33

## 2023-02-19 RX ADMIN — LEVOTHYROXINE SODIUM 50 MCG: 0.1 TABLET ORAL at 05:28

## 2023-02-19 RX ADMIN — MODAFINIL 200 MG: 100 TABLET ORAL at 07:57

## 2023-02-19 RX ADMIN — BACLOFEN 10 MG: 10 TABLET ORAL at 14:03

## 2023-02-19 RX ADMIN — MODAFINIL 200 MG: 100 TABLET ORAL at 20:29

## 2023-02-19 RX ADMIN — Medication 10 ML: at 07:57

## 2023-02-19 RX ADMIN — ASPIRIN 81 MG: 81 TABLET, FILM COATED ORAL at 20:29

## 2023-02-19 RX ADMIN — BUPROPION HYDROCHLORIDE 150 MG: 150 TABLET, EXTENDED RELEASE ORAL at 07:56

## 2023-02-19 RX ADMIN — AMANTADINE HYDROCHLORIDE 100 MG: 100 CAPSULE ORAL at 20:29

## 2023-02-19 RX ADMIN — OXYCODONE AND ACETAMINOPHEN 1 TABLET: 5; 325 TABLET ORAL at 18:28

## 2023-02-19 RX ADMIN — BACLOFEN 10 MG: 10 TABLET ORAL at 20:29

## 2023-02-19 ASSESSMENT — PAIN SCALES - GENERAL
PAINLEVEL_OUTOF10: 5
PAINLEVEL_OUTOF10: 4

## 2023-02-19 ASSESSMENT — PAIN DESCRIPTION - LOCATION: LOCATION: HIP

## 2023-02-19 ASSESSMENT — PAIN DESCRIPTION - DESCRIPTORS: DESCRIPTORS: ACHING;DISCOMFORT;DULL

## 2023-02-19 ASSESSMENT — PAIN DESCRIPTION - ORIENTATION: ORIENTATION: RIGHT

## 2023-02-19 NOTE — PROGRESS NOTES
Hospitalist Progress Note      PCP: Leeanne Grider MD    Date of Admission: 2/16/2023    Chief Complaint: fall, right femur intertroch fracture    Hospital Course:    Patient presented to the emergency department after falling at home. Now with complaints of right hip and leg pain. Denies losing consciousness or hitting her head. Patient denies any chest pain, fever, chills, nausea, vomiting, headache, dizziness/lightheadedness, abdominal pain. Vital signs are within normal limits and stable although she is somewhat hypertensive with pressure 173/94. Laboratory studies were unremarkable. Imaging revealed a right proximal femur intertrochanteric nondisplaced fracture. Orthopedic service was consulted. Medicine consulted for admission. Subjective: Patient was seen at bedside. Alert, following commands, mentions that her pain is very well controlled at this time. Does not have any acute concerns. Discussed about the plan regarding rehab, PT/OT. All questions were answered at this time      Medications:  Reviewed    Infusion Medications    sodium chloride       Scheduled Medications    amantadine  100 mg Oral BID    baclofen  10 mg Oral TID    buPROPion  150 mg Oral BID    levothyroxine  50 mcg Oral Daily    losartan  50 mg Oral Daily    sodium chloride flush  10 mL IntraVENous 2 times per day    modafinil  200 mg Oral BID    aspirin  81 mg Oral BID    gabapentin  100 mg Oral TID     PRN Meds: bisacodyl, oxyCODONE-acetaminophen, fentanNYL, sodium chloride flush, sodium chloride, promethazine **OR** ondansetron, polyethylene glycol, acetaminophen **OR** acetaminophen    No intake or output data in the 24 hours ending 02/19/23 1018      Exam:    BP (!) 146/115   Pulse 79   Temp 97.2 °F (36.2 °C) (Temporal)   Resp 16   Ht 4' 11\" (1.499 m)   Wt 95 lb (43.1 kg)   SpO2 98%   BMI 19.19 kg/m²     General appearance: No apparent distress, appears stated age and cooperative.   HEENT: Pupils equal, round, and reactive to light. Conjunctivae/corneas clear. Neck: Supple, with full range of motion. No jugular venous distention. Trachea midline. Respiratory:  Normal respiratory effort. Clear to auscultation, bilaterally without Rales/Wheezes/Rhonchi. Cardiovascular: Regular rate and rhythm with normal S1/S2 without murmurs, rubs or gallops. Abdomen: Soft, non-tender, non-distended with normal bowel sounds. Musculoskeletal: No clubbing, cyanosis or edema bilaterally. Decreased range of motion in the right hip secondary to pain  Skin: Skin color, texture, turgor normal.  No rashes or lesions. Dressings intact  Neurologic: No acute focal deficits  Psychiatric: Alert and oriented, thought content appropriate, normal insight    Labs:   Recent Labs     02/17/23  0552 02/18/23  0916 02/19/23  0609   WBC 6.8 13.6* 8.6   HGB 10.8* 9.8* 10.1*   HCT 32.9* 29.1* 31.5*    250 216     Recent Labs     02/17/23  0552 02/18/23  0916 02/19/23  0609    140 137   K 4.9 4.4 5.0    105 99   CO2 26 26 27   BUN 10 17 19   CREATININE 0.7 0.9 0.7   CALCIUM 7.9* 8.9 9.2     Recent Labs     02/16/23  1835 02/17/23  0552   AST 19 20   ALT 12 10   BILITOT 0.3 0.4   ALKPHOS 101 82     Recent Labs     02/17/23  0025   INR 1.1     No results for input(s): Reggie Patel in the last 72 hours.     Assessment/Plan:    Active Hospital Problems    Diagnosis Date Noted    Fracture of femur, intertrochanteric, closed, right, initial encounter [facility]) [place] 02/17/2023     Priority: Medium    Other fracture of right femur, initial encounter for closed fracture [facility]) [S72.8X1A] 02/16/2023     Priority: Medium   -Right femur intertrochanteric fracture  -Fall at home  -Hypertension  -Hyperlipidemia  -Hypothyroidism  -History of multiple sclerosis        PLAN:  -Consulted orthopedic service-status post right hip ORIF-2/17/2023  -Pain management  -PT/OT consulted  - post op hb- 9.8--->10.1,stable  -Normal saline 75 mL/h  -Hold anticoagulants, restart when okay per surgery  -Continue home medications      DVT Prophylaxis: When okay per surgery  Diet: ADULT DIET;  Regular  Code Status: Full Code    PT/OT Eval Status: Consulted    Dispo -status post ORIF, PT/OT, rehab, medically stable for DC otherwise    Rolando Reynolds MD

## 2023-02-19 NOTE — PROGRESS NOTES
I have seen and evaluated the patient and agree with the above assessment on today's visit. I have performed the key components of the history and physical examination and concur completely with the findings and plans as documented. Agree with ROS, examination, FMH, PMH, PSH, SocHx, and allergies as above. Patient seen and examined's morning. She is doing very well. She is hoping to discharge home after advancing with physical therapy. She is very happy with the results. All of her questions answered in detail. Pain is well controlled. All the instructions in the chart. At this point orthopedic surgery will follow peripherally. She will follow-up in my office in 2 weeks      Bruce Baptiste0 Surgery   2/19/2023  9:57 AM          Department of Orthopedic Surgery   Progress Note    POD 2 right intertrochanteric ORIF patient seen and examined. Pain controlled. Patient work with therapy today. Discussed her surgery in detail and all questions addressed. Denies chest pain, shortness of breath, calf pain, dizziness/lightheadedness, fevers or chills. VITALS:  BP (!) 146/115   Pulse 79   Temp 97.2 °F (36.2 °C) (Temporal)   Resp 16   Ht 4' 11\" (1.499 m)   Wt 95 lb (43.1 kg)   SpO2 98%   BMI 19.19 kg/m²     GENERAL: In bed, comfortable, oriented x4  MUSCULOSKELETAL:   right lower extremity:  Dressing C/D/I  Compartments soft and compressible, calf non-tender  Palpable dorsalis pedis and posterior tibialis pulse, brisk cap refill to toes, foot warm and perfused  Sensation intact to light touch in sural/deep peroneal/superficial peroneal/saphenous/posterior tibial nerve distributions to foot/ankle.   Demonstrates active ankle plantar/dorsiflexion/great toe extension    CBC:   Lab Results   Component Value Date/Time    WBC 8.6 02/19/2023 06:09 AM    HGB 10.1 02/19/2023 06:09 AM    HCT 31.5 02/19/2023 06:09 AM     02/19/2023 06:09 AM       ASSESSMENT  S/p right intertrochanteric ORIF 2/17    PLAN    Continue physical therapy and protocol: Weightbearing as tolerated right lower extremity  Reviewed patient's imaging with her this morning. 24 hour abx coverage completed  Deep venous thrombosis prophylaxis -per medicine recommendations. Okay to resume postop day 1, early mobilization  PT/OT fall precautions  Pain Control: IV and PO  Monitor H&H 8.6 this morning  D/C Plan:  pending sw/cm and therapy recommendations. Ok to discharge from orthopedic standpoint once appropriate discharge plan is in place orthopedics will follow peripherally at this point please call with any questions or concerns.     Electronically signed by Yi Segura DO on 2/19/2023 at 8:36 AM

## 2023-02-20 LAB
ANION GAP SERPL CALCULATED.3IONS-SCNC: 7 MMOL/L (ref 7–16)
BUN BLDV-MCNC: 17 MG/DL (ref 6–23)
CALCIUM SERPL-MCNC: 9 MG/DL (ref 8.6–10.2)
CHLORIDE BLD-SCNC: 102 MMOL/L (ref 98–107)
CO2: 30 MMOL/L (ref 22–29)
CREAT SERPL-MCNC: 0.7 MG/DL (ref 0.5–1)
GFR SERPL CREATININE-BSD FRML MDRD: >60 ML/MIN/1.73
GLUCOSE BLD-MCNC: 84 MG/DL (ref 74–99)
POTASSIUM REFLEX MAGNESIUM: 4.6 MMOL/L (ref 3.5–5)
SODIUM BLD-SCNC: 139 MMOL/L (ref 132–146)

## 2023-02-20 PROCEDURE — 80048 BASIC METABOLIC PNL TOTAL CA: CPT

## 2023-02-20 PROCEDURE — 97530 THERAPEUTIC ACTIVITIES: CPT

## 2023-02-20 PROCEDURE — 97535 SELF CARE MNGMENT TRAINING: CPT

## 2023-02-20 PROCEDURE — 1200000000 HC SEMI PRIVATE

## 2023-02-20 PROCEDURE — 6370000000 HC RX 637 (ALT 250 FOR IP): Performed by: STUDENT IN AN ORGANIZED HEALTH CARE EDUCATION/TRAINING PROGRAM

## 2023-02-20 PROCEDURE — 6360000002 HC RX W HCPCS: Performed by: STUDENT IN AN ORGANIZED HEALTH CARE EDUCATION/TRAINING PROGRAM

## 2023-02-20 PROCEDURE — 36415 COLL VENOUS BLD VENIPUNCTURE: CPT

## 2023-02-20 PROCEDURE — 2580000003 HC RX 258: Performed by: STUDENT IN AN ORGANIZED HEALTH CARE EDUCATION/TRAINING PROGRAM

## 2023-02-20 RX ORDER — ENOXAPARIN SODIUM 100 MG/ML
40 INJECTION SUBCUTANEOUS DAILY
Status: DISCONTINUED | OUTPATIENT
Start: 2023-02-20 | End: 2023-02-21 | Stop reason: HOSPADM

## 2023-02-20 RX ADMIN — OXYCODONE AND ACETAMINOPHEN 1 TABLET: 5; 325 TABLET ORAL at 15:56

## 2023-02-20 RX ADMIN — Medication 10 ML: at 09:51

## 2023-02-20 RX ADMIN — BUPROPION HYDROCHLORIDE 150 MG: 150 TABLET, EXTENDED RELEASE ORAL at 19:47

## 2023-02-20 RX ADMIN — ASPIRIN 81 MG: 81 TABLET, FILM COATED ORAL at 09:49

## 2023-02-20 RX ADMIN — AMANTADINE HYDROCHLORIDE 100 MG: 100 CAPSULE ORAL at 19:48

## 2023-02-20 RX ADMIN — LOSARTAN POTASSIUM 50 MG: 50 TABLET, FILM COATED ORAL at 09:49

## 2023-02-20 RX ADMIN — ASPIRIN 81 MG: 81 TABLET, FILM COATED ORAL at 19:47

## 2023-02-20 RX ADMIN — ONDANSETRON 4 MG: 2 INJECTION INTRAMUSCULAR; INTRAVENOUS at 22:29

## 2023-02-20 RX ADMIN — MODAFINIL 200 MG: 100 TABLET ORAL at 09:49

## 2023-02-20 RX ADMIN — GABAPENTIN 100 MG: 100 CAPSULE ORAL at 19:48

## 2023-02-20 RX ADMIN — AMANTADINE HYDROCHLORIDE 100 MG: 100 CAPSULE ORAL at 09:50

## 2023-02-20 RX ADMIN — BACLOFEN 10 MG: 10 TABLET ORAL at 19:48

## 2023-02-20 RX ADMIN — BACLOFEN 10 MG: 10 TABLET ORAL at 15:51

## 2023-02-20 RX ADMIN — Medication 10 ML: at 19:48

## 2023-02-20 RX ADMIN — LEVOTHYROXINE SODIUM 50 MCG: 0.1 TABLET ORAL at 06:05

## 2023-02-20 RX ADMIN — MODAFINIL 200 MG: 100 TABLET ORAL at 19:47

## 2023-02-20 RX ADMIN — BACLOFEN 10 MG: 10 TABLET ORAL at 09:49

## 2023-02-20 RX ADMIN — BUPROPION HYDROCHLORIDE 150 MG: 150 TABLET, EXTENDED RELEASE ORAL at 09:49

## 2023-02-20 RX ADMIN — GABAPENTIN 100 MG: 100 CAPSULE ORAL at 15:51

## 2023-02-20 RX ADMIN — GABAPENTIN 100 MG: 100 CAPSULE ORAL at 09:49

## 2023-02-20 ASSESSMENT — PAIN DESCRIPTION - DESCRIPTORS: DESCRIPTORS: ACHING;DULL;DISCOMFORT

## 2023-02-20 ASSESSMENT — PAIN SCALES - GENERAL: PAINLEVEL_OUTOF10: 4

## 2023-02-20 ASSESSMENT — PAIN DESCRIPTION - LOCATION: LOCATION: HIP

## 2023-02-20 NOTE — PROGRESS NOTES
Occupational Therapy  OT BEDSIDE TREATMENT NOTE   [facility] [address]  [address] [place], [place] [address]:  Patient Name: Debora Alvarez  MRN: [id number]  : 1960  Room: [address] [place] [address]     Evaluating OT: Leonel Posadas OTD, OTR/L, [id number]       Referring Provider: Erick Sebastian MD    Specific Provider Orders/Date: OT eval and treat (23)    Diagnosis: Other fracture of right femur, initial encounter for closed fracture [facility]) [S72.8X1A]  Closed nondisplaced intertrochanteric fracture of right femur, initial encounter ([facility] [T95.031B]    Surgeries/Procedures:  : HIP OPEN REDUCTION INTERNAL FIXATION-RIGHT       Pt admitted with R femur fx. Hx MS       Pertinent Medical History:       has a past medical history of Confusion, Depressive disorder, Diastolic CHF ([facility] Leukopenia, MS (multiple sclerosis) ([facility] and Pneumonia.            Precautions:  Fall Risk, Hx MS, WBAT RLE      Assessment of current deficits    [x] Functional mobility            [x]ADLs           [x] Strength                   [x]Cognition    [x] Functional transfers          [x] IADLs          [x] Safety Awareness   [x]Endurance    [] Fine Coordination              [x] Balance      [] Vision/perception    [x]Sensation      []Gross Motor Coordination  [x] ROM           [] Delirium                   [] Motor Control      OT PLAN OF CARE   OT POC based on physician orders, patient diagnosis and results of clinical assessment     Frequency/Duration 1-3 days/wk for 2 weeks PRN   Specific OT Treatment Interventions to include:   * Instruction/training on adapted ADL techniques and AE recommendations to increase functional independence within precautions       * Training on energy conservation strategies, correct breathing pattern and techniques to improve independence/tolerance for self-care routine  * Functional transfer/mobility training/DME recommendations for increased independence, safety, and fall prevention  * Patient/Family education to increase follow through with safety techniques and functional independence  * Recommendation of environmental modifications for increased safety with functional transfers/mobility and ADLs  * Cognitive retraining/development of therapeutic activities to improve problem solving, judgement, memory, and attention for increased safety/participation in ADL/IADL tasks  * Therapeutic exercise to improve motor endurance, ROM, and functional strength for ADLs/functional transfers  * Therapeutic activities to facilitate/challenge dynamic balance, stand tolerance for increased safety and independence with ADLs  * Therapeutic activities to facilitate gross/fine motor skills for increased independence with ADLs  * Positioning to improve skin integrity, interaction with environment and functional independence  * Delirium prevention/treatment     Recommended Adaptive Equipment/DME: LB AE, TBD      Home Living: Pt lives with mother (who was recently admitted to SNF) in 2 story home with chair lift and bed and bath on 2nd level. Bathroom setup: walk-in shower to be installed with built in shower chair and grab bars   Equipment owned: FWW/WC     Prior Level of Function: Ind with ADLs , Ind with IADLs; Used FWW for functional mobility. Pt reports 5 falls within last month. Pt states falls occur when she turns with FWW.   Driving: No  Occupation: None stated     Pain Level: 5/10; in R hip  Cognition: A&O: 4/4; Follows 2 step directions              Memory: G-              Sequencing: G-              Problem solving:  F              Judgement/safety:  F                     Functional Assessment:  AM-PAC Daily Activity Raw Score: 16/24    Initial Eval Status  Date: 2/17/23 Treatment Status  Date:2/20/23 STGs = LTGs  Time frame: 10-14 days   Feeding Independent     Independent      Grooming Stand by Assist     SBA  Sitting in chair Independent    UB Dressing Minimal Assist     SBA  To dof/don gown sitting in chair  Independent    LB Dressing Maximal Assist     Max A   to dof/don pullup brief, pants, socks. Vc for sequencing dressing RLE first. Pt educated on use of AE including reacher and sock aide to reduce R hip pain due to pt impulsively managing RLE during LB ADLs. Pt receptive to use of sock aide although resistive to use of reacher. Modified [place]    Bathing Moderate Assist    Mod A  For UB/LB sponge bathing sitting/standing with ww, increased assist for standing aspects of LB bathing due to stand balance deficits   Modified [place]    Toileting Moderate Assist   Posterior hygiene in standing Mod A  For clothing management/hygiene due to stand balance/tolerance deficits requiring BUE support to maintain balance Modified [place]    Bed Mobility  Supine to sit: Moderate Assist   Sit to supine: Maximal Assist    Supine to sit: NT  Sit to supine: Mod Assist  Supine to sit: Modified [place]   Sit to supine: Modified [place]    Functional Transfers Sit to stand: Moderate Assist  Stand to sit: Moderate Assist  Stand pivot: NT    Sit to stand:Min Assist  Stand to sit: Min Assist  Stand pivot: Mod Assist  Vc for hand placement for controlled descent/ascent. Increased assist for SPT due to pt having difficulty weight shifting onto RLE Modified [place]    Functional Mobility Moderate Assist with FWW  For side steps at EOB    Moderate Assist with FWW  For side steps at EOB  Modified [place]    Balance Sitting:     Static: S    Dynamic:Min A  Standing: Mod A    during functional activity Sitting:     Static: S    Dynamic:Min A  Standing: Mod A    during functional activity  Sitting:     Static:  Ind    Dynamic:Ind  Standing:  Mod I   Activity Tolerance Fair+ with light activity Fair+ with light activity  WFL  For full ADL/IADL tasks   Visual/  Perceptual Glasses: Yes          Safety F  F- G-      Hand Dominance: R    AROM (PROM) Strength Additional Info:    RUE  WFL Grossly 4/5 Good  and wfl FMC/dexterity noted during ADL tasks         LUE WFL Grossly 4/5 Good  and wfl FMC/dexterity noted during ADL tasks         Hearing: WFL   Sensation:  C/o numbness/tingling in hands/feet  Tone: WFL   Edema: Unremarkable       Comments: Upon arrival pt supine in bed. ADL retraining to increase safety and indep in dressing, bathing and toileting tasks, balance and trf training to increase participation in functional mobility and standing aspects of ADLs with increased safety. Pt educated on techniques to increase independence and safety during ADLs, bed mobility, and functional transfers. Pt would benefit from continued skilled OT to increase safety and independence with completion of ADL/IADL tasks for functional independence and quality of life. At end of session pt returned to supine, call light within reach. Pt has made Fair- progress towards set goals.      Continue with current plan of care    Treatment Time In:1016            Treatment Time Out: 2135             Treatment Charges: Mins Units   Ther Ex  15958     Manual Therapy 26999     Thera Activities 50465 15 1   ADL/Home Mgt 88043 23 2   Neuro Re-ed 47324     Group Therapy      Orthotic manage/training  89639     Non-Billable Time     Total Timed Treatment [id number] VARNER/L [address]

## 2023-02-20 NOTE — PROGRESS NOTES
Physical Therapy Treatment     Name: Alexandria Lesches  : 1960  MRN: [id number]      Date of Service: 2023    Evaluating PT:  Jose Alfredo Carney, PT, DPT [id number]    Room #:  9/3193-T  Diagnosis:  Other fracture of right femur, initial encounter for closed fracture [facility]) [S72.8X1A]  Closed nondisplaced intertrochanteric fracture of right femur, initial encounter [facility]) Breanna Gaviria  PMHx/PSHx:    Past Medical History:   Diagnosis Date    Confusion 2015    Depressive disorder     Diastolic CHF ([facility] 9/3/9501    Leukopenia 2015    MS (multiple sclerosis) ([facility]     Neuromuscular disorder ([facility]     Pneumonia      Procedure/Surgery:  : HIP OPEN REDUCTION INTERNAL FIXATION-RIGHT  Reason for admission: Fracture of femur, intertrochanteric, closed, right, initial encounter ([facility]  Precautions:  Fall Risk, Hx MS, WBAT RLE  Equipment Needs:  TBD    SUBJECTIVE:  Pt lives with mother (who was recently admitted to SNF) in 2 story home with chair lift and bed and bath on 2nd level, 3 KADIE home. Pt ambulated with FWW PTA. Pt reports 5 falls within last month. Pt states falls occur when she turns with FWW. OBJECTIVE:   Initial Evaluation  Date: 23 Treatment Short Term/ Long Term   Goals   AM-PAC 6 Clicks  64/74    Was pt agreeable to Eval/treatment? Yes yes    Does pt have pain?  7/10 in R hip R hip    Bed Mobility  Rolling: min a  Supine to sit: mod a  Sit to supine: max a  Scooting: min a Rolling: min a  Supine to sit: min A  Sit to supine: NT  Scooting: min a Rolling: ind  Supine to sit: ind  Sit to supine: ind  Scooting: ind   Transfers Sit to stand: mod a  Stand to sit: mod a  Stand pivot: NT Sit to stand: min a  Stand to sit: min a  Stand pivot: mod A <>min A FWW Sit to stand: ind  Stand to sit: ind  Stand pivot: ind   Ambulation   2 ft side stepping to HOB mod A FWW 2x15 ft FWW min a <>mod A  150 feet with AAD ind   Stair negotiation: ascended and descended NT NT 3 steps with one rail ind ROM BUE:  Refer to OT eval   BLE:  WNL     Strength BUE:  Refer to OT eval   BLE: LLE WNL, RLE 4/5 (R flexion limited d/t pain)  5/5 globally   Balance Sitting EOB:  SBA  Dynamic Standing:  mod A FWW  Sitting EOB:  ind  Dynamic Standing:  ind     Pt is A & O x 4  Sensation:  Pt notes n/t at baseline to RLE  Edema:  none noted     Vitals:  Blood Pressure at rest -- Blood Pressure post session --   Heart Rate at rest 75 BPM  Heart Rate post session --   SPO2 at rest -- SPO2 post session --     Therapeutic Exercises:  none performed this visit    Patient education  Pt educated on importance of OOB activity, WBAT restrictions    Patient response to education:   Pt verbalized understanding Pt demonstrated skill Pt requires further education in this area   x x x     ASSESSMENT:    Conditions Requiring Skilled Therapeutic Intervention:    [x]Decreased strength     []Decreased ROM  [x]Decreased functional mobility  [x]Decreased balance   [x]Decreased endurance   []Decreased posture  [x]Decreased sensation  []Decreased coordination   []Decreased vision  []Decreased safety awareness   [x]Increased pain       Comments:  Pt in bed on arrival, pleasant and motivated to participate in PT tx. Pt in bed on arrival, notes decreased pain in hip this date. Pt control have difficulty with weakness in R foot (hx of MS), notes she has brace but does not fit. Pt educated on benefits/importance of brace wearing d/t weakness in hip as well to improve safety/independence with ambulation. Visual demonstration/education regarding weight shifting provided today and pt with improved ambulation with focus on L weight shift. Pt performed multiple bouts of short distance ambulation in room and SPT x2 performed to/from bedside chair to ensure safety with transfer. Pt with improved stability with walking today as session progressed but initially required increased assist for safety.  Pt educated extensively on benefits of rehab post-DC as pt noting she wants to go home but has to care for her mother. Pt left in chair at end of session with all needs met. Pt will benefit from skilled rehab in inpatient facility prior to DC home to improve safety prior to DC. Treatment:  Patient practiced and was instructed in the following treatment:    Bed mobility: performed with cues for safety awareness and proper hand placement to promote improved functional independence. Transfer Training: STS x2 at EOB/bedside chair to [facility], [facility] to/from bed   Gait training: short distance ambulation in room     Pt's/ family goals   1. Return home safely. Prognosis is good for reaching above PT goals. Patient and or family understand(s) diagnosis, prognosis, and plan of care.   yes    PHYSICAL THERAPY PLAN OF CARE:    PT POC is established based on physician order and patient diagnosis     Referring provider/PT Order:    02/17/23 1345  PT eval and treat  Start:  02/17/23 1345,   End:  02/17/23 1345,   ONE TIME,   Standing Count:  1 Occurrences,   R        Order went unreviewed at transfer on Fri Feb 17, 2023  2:27 PM    Batsheva Red MD     Diagnosis:  Other fracture of right femur, initial encounter for closed fracture [facility]) [S72.8X1A]  Closed nondisplaced intertrochanteric fracture of right femur, [facility] [S72.144A]  Specific instructions for next treatment:  increase activity as able    Current Treatment Recommendations:   [x] Strengthening to improve independence with functional mobility   [] ROM to improve independence with functional mobility   [x] Balance Training to improve static/dynamic balance and to reduce fall risk  [x] Endurance Training to improve activity tolerance during functional mobility   [x] Transfer Training to improve safety and independence with all functional transfers   [x] Gait Training to improve gait mechanics, endurance and assess need for appropriate assistive device  [x] Stair Training in preparation for safe discharge home and/or into the community   [] Positioning to prevent skin breakdown and contractures  [] Safety and Education Training   [] Patient/Caregiver Education   [] HEP  [] Other     PT long term treatment goals are located in above grid    Frequency of treatments: 2-5x/week x 1-2 weeks. Time in  0825  Time out  0905    Total Treatment Time  40 minutes     Evaluation Time includes thorough review of current medical information, gathering information on past medical history/social history and prior level of function, completion of standardized testing/informal observation of tasks, assessment of data and education on plan of care and goals.     CPT codes:  [] Low Complexity PT evaluation 34196  [] Moderate Complexity PT evaluation 60608  [] High Complexity PT evaluation 29249  [] PT Re-evaluation 98391  [] Gait training 79524 -- minutes  [] Manual therapy [id number] -- minutes  [x] Therapeutic activities 21358 40 minutes  [] Therapeutic exercises 41038 -- minutes  [] Neuromuscular reeducation 82055 -- minutes     Altamese Everett, PT, DPT  [id number]

## 2023-02-20 NOTE — CARE COORDINATION
02/20/23 Update CM Note: Patient is accepted at [facility] and precert is being started today. PASSAR done/alla/Destination and ambulance form done. She will need a covid at discharge. Envelope placed in soft chart.  Electronically signed by Bienvenido Bustos RN CM on 2/20/2023 at 3:11 PM Airway    Date/Time: 8/17/2021 8:51 AM  Performed by: Broderick Anderson M.D.  Authorized by: Broderick Anderson M.D.     Location:  OR  Urgency:  Elective  Difficult Airway: No    Indications for Airway Management:  Anesthesia      Spontaneous Ventilation: absent    Sedation Level:  Deep  Preoxygenated: Yes    Mask Difficulty Assessment:  1 - vent by mask  Final Airway Type:  Supraglottic airway  Final Supraglottic Airway:  Standard LMA    SGA Size:  3  Number of Attempts at Approach:  1

## 2023-02-20 NOTE — PLAN OF CARE
Patient's chart updated to reflect:      . - HF care plan, HF education points and HF discharge instructions.  -Orders: 2 gram sodium diet, daily weights, I/O.  -PCP and/or Cardiologist appointment to be scheduled within 7 days of hospital discharge.  -History of HF, not primary admission Dx.   Patient admitted for treatment of ASSESSMENT:  -Right femur fracture  -Fall at home  -Hypertension  -Hyperlipidemia  -Hypothyroidism  -History of multiple sclerosis  Hailey Ma RN RN, BSN  Heart Failure Navigator
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
## 2023-02-20 NOTE — PROGRESS NOTES
Hospitalist Progress Note      PCP: Allan Saldana MD    Date of Admission: 2/16/2023    Chief Complaint: fall, right femur intertroch fracture    Hospital Course:    Patient presented to the emergency department after falling at home. Now with complaints of right hip and leg pain. Denies losing consciousness or hitting her head. Patient denies any chest pain, fever, chills, nausea, vomiting, headache, dizziness/lightheadedness, abdominal pain. Vital signs are within normal limits and stable although she is somewhat hypertensive with pressure 173/94. Laboratory studies were unremarkable. Imaging revealed a right proximal femur intertrochanteric nondisplaced fracture. Orthopedic service was consulted. Medicine consulted for admission. Consulted orthopedic service-status post right hip ORIF-2/17/2023-Pain management. Post op hb stable. PT/OT consulted. Medically stable for dc pending rehab    Subjective: Patient was seen at bedside. Alert, following commands, mentions that her pain is very well controlled at this time. Does not have any acute concerns. Discussed about the plan regarding rehab- would like to go home if possible. PT in room and mentioned that would benefit from acute rehab, patient was explained about the same.       Medications:  Reviewed    Infusion Medications    sodium chloride       Scheduled Medications    enoxaparin  40 mg SubCUTAneous Daily    amantadine  100 mg Oral BID    baclofen  10 mg Oral TID    buPROPion  150 mg Oral BID    levothyroxine  50 mcg Oral Daily    losartan  50 mg Oral Daily    sodium chloride flush  10 mL IntraVENous 2 times per day    modafinil  200 mg Oral BID    aspirin  81 mg Oral BID    gabapentin  100 mg Oral TID     PRN Meds: bisacodyl, oxyCODONE-acetaminophen, fentanNYL, sodium chloride flush, sodium chloride, promethazine **OR** ondansetron, polyethylene glycol, acetaminophen **OR** acetaminophen    No intake or output data in the 24 hours ending 02/20/23 1028      Exam:    /72   Pulse 74   Temp 97.3 °F (36.3 °C) (Temporal)   Resp 18   Ht 4' 11\" (1.499 m)   Wt 95 lb (43.1 kg)   SpO2 96%   BMI 19.19 kg/m²     General appearance: No apparent distress, appears stated age and cooperative. HEENT: Pupils equal, round, and reactive to light. Conjunctivae/corneas clear. Neck: Supple, with full range of motion. No jugular venous distention. Trachea midline. Respiratory:  Normal respiratory effort. Clear to auscultation, bilaterally without Rales/Wheezes/Rhonchi. Cardiovascular: Regular rate and rhythm with normal S1/S2 without murmurs, rubs or gallops. Abdomen: Soft, non-tender, non-distended with normal bowel sounds. Musculoskeletal: No clubbing, cyanosis or edema bilaterally. Decreased range of motion in the right hip secondary to pain  Skin: Skin color, texture, turgor normal.  No rashes or lesions. Dressings intact  Neurologic: No acute focal deficits  Psychiatric: Alert and oriented, thought content appropriate, normal insight    Labs:   Recent Labs     02/18/23  0916 02/19/23  0609   WBC 13.6* 8.6   HGB 9.8* 10.1*   HCT 29.1* 31.5*    216     Recent Labs     02/18/23  0916 02/19/23  0609 02/20/23  0558    137 139   K 4.4 5.0 4.6    99 102   CO2 26 27 30*   BUN 17 19 17   CREATININE 0.9 0.7 0.7   CALCIUM 8.9 9.2 9.0     No results for input(s): AST, ALT, BILIDIR, BILITOT, ALKPHOS in the last 72 hours. No results for input(s): INR in the last 72 hours. No results for input(s): Chelle Earnest in the last 72 hours.     Assessment/Plan:    Active Hospital Problems    Diagnosis Date Noted    Fracture of femur, intertrochanteric, closed, right, initial encounter [facility] [place] [place] 02/17/2023     Priority: Medium    Other fracture of right femur[facility]     Priority: Medium   -Right femur intertrochanteric fracture  -Fall at home  -Hypertension  -Hyperlipidemia  -Hypothyroidism  -History of multiple sclerosis        PLAN:  -Consulted orthopedic service-status post right hip ORIF-2/17/2023  -Pain management  -PT/OT consulted  - post op hb- 9.8--->10.1,stable  -Continue home medications      DVT Prophylaxis: Lovenox  Diet: ADULT DIET; Regular;  Low Sodium (2 gm)  Code Status: Full Code    PT/OT Eval Status: Consulted    Dispo -status post ORIF, PT/OT, rehab- ARU evaluation, medically stable for DC otherwise    Yissel Arteaga MD

## 2023-02-20 NOTE — CARE COORDINATION
02/20/23 Transition of Care: Met with patient at the bedside. She is alert and oriented. She is POD 3 of right ORIF hip. She is from home where she has fallen 5 times in last month. 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 Her mother is currently in [address] [place] [address] and due to be discharged today. Patient is worried and anxious about this. She states there is no one to help care for the mother. It has been explained in many different ways that patient is not safe to care for mother in her limited state currently. She is not getting up without moderate assistance. She is voiding in her bed as she cannot move quick. She does not feel steady on her feet. PT saw patient today and found patient to be moderate assist. Discussion took place regarding patient requiring a skilled facility for safety and strengthening. She states she understands and wishes for a referral to [facility] where her mother is currently. She states there are no family to help with the mother. Contact made with Makenzie Davis from [facility] to evaluate patient for skilled and to follow up and get some updated information for the patient on her mother. Will continue to follow for determination from Makenzie Davis. Electronically signed by Carli Diane RN on 2/20/2023 at 11:51 AM       The Plan for Transition of Care is related to the following treatment goals: rehab facility    The Patient and/or patient representative  was provided with a choice of provider and agrees   with the discharge plan. [x] Yes [] No    Freedom of choice list was provided with basic dialogue that supports the patient's individualized plan of care/goals, treatment preferences and shares the quality data associated with the providers.  [x] Yes [] No     _______________________________________________________  Case Management Assessment  Initial Evaluation    Date/Time of Evaluation: 2/20/2023 11:54 AM  Assessment Completed by: Carli Diane RN    If patient is discharged prior to next notation, then this note serves as note for discharge by case management.    Patient Name: Lyla Chung                   YOB: 1960  Diagnosis: Other fracture of right femur, initial encounter for closed fracture ([facility]) [S72.8X1A]  Closed nondisplaced intertrochanteric fracture of right femur, initial encounter ([facility]) [S72.144A]                   Date / Time: 2/16/2023  2:40 PM    Patient Admission Status: Inpatient   Readmission Risk (Low < 19, Mod (19-27), High > 27): Readmission Risk Score: 11.7    Current PCP: Kamaljit Rodriguez MD  PCP verified by CM? Yes    Chart Reviewed: Yes      History Provided by: Patient  Patient Orientation: Alert and Oriented, Person, Place, Situation, Self    Patient Cognition: Alert    Hospitalization in the last 30 days (Readmission):  No    If yes, Readmission Assessment in  Navigator will be completed.    Advance Directives:      Code Status: Full Code   Patient's Primary Decision Maker is: Patient Declined (Legal Next of Kin Remains as Decision Maker)      Discharge Planning:    Patient lives with: Parent Type of Home: House  Primary Care Giver: Self  Patient Support Systems include: None   Current Financial resources:    Current community resources:    Current services prior to admission: Emergency Call  System            Current DME:              Type of Home Care services:  Aide Services    ADLS  Prior functional level: Independent in ADLs/IADLs  Current functional level: Assistance with the following:, Bathing, Dressing, Toileting, Feeding, Cooking, Housework, Shopping, Mobility    PT AM-PAC: 14 /24  OT AM-PAC: 16 /24    Family can provide assistance at DC: No (no family available for patient)  Would you like Case Management to discuss the discharge plan with any other family members/significant others, and if so, who? 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 Patient is recovering from surgery currently and needs to be skilled at a facility for rehab. Potential Assistance needed at discharge: [address]            Potential DME:    Patient expects to discharge to: [address] [place] [address] for transportation at discharge:      Financial    Payor: Rufina Richards / Plan: [place] / Product Type: *No Product type* /     Does insurance require precert for SNF: Yes    Potential assistance Purchasing Medications: No  Meds-to-Beds request: Yes      [address] [place] [address] Road  Phone: [phone] Fax: [phone]      Notes:    Factors facilitating achievement of predicted outcomes: Cooperative and Pleasant    Barriers to discharge: Pain, No family support, No caregiver support, Limited safety awareness, Limited insight into deficits, and Unrealistic expectations    Additional Case Management Notes: SEE ABOVE NOTE    The Plan for Transition of Care is related to the following treatment goals of Other fracture of right femur, initial encounter for closed fracture ([facility] [S72.8X1A]  Closed nondisplaced intertrochanteric fracture of right femur, initial encounter ([facility] [U76.087G]    IF APPLICABLE: The Patient and/or patient representative Chica Myles and her family were provided with a choice of provider and agrees with the discharge plan. Freedom of choice list with basic dialogue that supports the patient's individualized plan of care/goals and shares the quality data associated with the providers was provided to:     Patient Representative Name:       The Patient and/or Patient Representative Agree with the Discharge Plan?       Gabriel Sutherland RN  Case Management Department  Ph: [phone] Fax: [phone]

## 2023-02-21 VITALS
RESPIRATION RATE: 18 BRPM | HEIGHT: 59 IN | SYSTOLIC BLOOD PRESSURE: 133 MMHG | BODY MASS INDEX: 19.15 KG/M2 | OXYGEN SATURATION: 99 % | TEMPERATURE: 97.8 F | WEIGHT: 95 LBS | DIASTOLIC BLOOD PRESSURE: 78 MMHG | HEART RATE: 60 BPM

## 2023-02-21 LAB — SARS-COV-2, NAAT: DETECTED

## 2023-02-21 PROCEDURE — 6370000000 HC RX 637 (ALT 250 FOR IP): Performed by: STUDENT IN AN ORGANIZED HEALTH CARE EDUCATION/TRAINING PROGRAM

## 2023-02-21 PROCEDURE — 6360000002 HC RX W HCPCS: Performed by: STUDENT IN AN ORGANIZED HEALTH CARE EDUCATION/TRAINING PROGRAM

## 2023-02-21 PROCEDURE — 2580000003 HC RX 258: Performed by: STUDENT IN AN ORGANIZED HEALTH CARE EDUCATION/TRAINING PROGRAM

## 2023-02-21 PROCEDURE — 87635 SARS-COV-2 COVID-19 AMP PRB: CPT

## 2023-02-21 PROCEDURE — 6370000000 HC RX 637 (ALT 250 FOR IP): Performed by: INTERNAL MEDICINE

## 2023-02-21 RX ORDER — ASPIRIN 81 MG/1
325 TABLET ORAL 2 TIMES DAILY
Qty: 224 TABLET | Refills: 0 | Status: SHIPPED | OUTPATIENT
Start: 2023-02-21 | End: 2023-02-21 | Stop reason: HOSPADM

## 2023-02-21 RX ORDER — ASPIRIN 325 MG
325 TABLET, DELAYED RELEASE (ENTERIC COATED) ORAL DAILY
Qty: 30 TABLET | Refills: 0 | Status: SHIPPED | OUTPATIENT
Start: 2023-02-21 | End: 2024-02-21

## 2023-02-21 RX ORDER — SENNA AND DOCUSATE SODIUM 50; 8.6 MG/1; MG/1
1 TABLET, FILM COATED ORAL 2 TIMES DAILY
Status: DISCONTINUED | OUTPATIENT
Start: 2023-02-21 | End: 2023-02-21 | Stop reason: HOSPADM

## 2023-02-21 RX ORDER — SENNA AND DOCUSATE SODIUM 50; 8.6 MG/1; MG/1
1 TABLET, FILM COATED ORAL 2 TIMES DAILY
Qty: 30 TABLET | Refills: 0 | DISCHARGE
Start: 2023-02-21

## 2023-02-21 RX ORDER — OXYCODONE HYDROCHLORIDE AND ACETAMINOPHEN 5; 325 MG/1; MG/1
1 TABLET ORAL EVERY 6 HOURS PRN
Qty: 5 TABLET | Refills: 0 | Status: SHIPPED | OUTPATIENT
Start: 2023-02-21 | End: 2023-02-28

## 2023-02-21 RX ADMIN — GABAPENTIN 100 MG: 100 CAPSULE ORAL at 14:47

## 2023-02-21 RX ADMIN — ASPIRIN 81 MG: 81 TABLET, FILM COATED ORAL at 08:44

## 2023-02-21 RX ADMIN — BUPROPION HYDROCHLORIDE 150 MG: 150 TABLET, EXTENDED RELEASE ORAL at 08:44

## 2023-02-21 RX ADMIN — BACLOFEN 10 MG: 10 TABLET ORAL at 14:47

## 2023-02-21 RX ADMIN — Medication 10 ML: at 08:42

## 2023-02-21 RX ADMIN — OXYCODONE AND ACETAMINOPHEN 1 TABLET: 5; 325 TABLET ORAL at 08:44

## 2023-02-21 RX ADMIN — POLYETHYLENE GLYCOL 3350 17 G: 17 POWDER, FOR SOLUTION ORAL at 11:45

## 2023-02-21 RX ADMIN — MODAFINIL 200 MG: 100 TABLET ORAL at 08:42

## 2023-02-21 RX ADMIN — AMANTADINE HYDROCHLORIDE 100 MG: 100 CAPSULE ORAL at 08:44

## 2023-02-21 RX ADMIN — BACLOFEN 10 MG: 10 TABLET ORAL at 08:44

## 2023-02-21 RX ADMIN — ENOXAPARIN SODIUM 40 MG: 100 INJECTION SUBCUTANEOUS at 08:42

## 2023-02-21 RX ADMIN — SENNOSIDES AND DOCUSATE SODIUM 1 TABLET: 50; 8.6 TABLET ORAL at 11:45

## 2023-02-21 RX ADMIN — LEVOTHYROXINE SODIUM 50 MCG: 0.1 TABLET ORAL at 06:16

## 2023-02-21 RX ADMIN — GABAPENTIN 100 MG: 100 CAPSULE ORAL at 08:44

## 2023-02-21 ASSESSMENT — PAIN DESCRIPTION - FREQUENCY
FREQUENCY: CONTINUOUS
FREQUENCY: CONTINUOUS

## 2023-02-21 ASSESSMENT — PAIN DESCRIPTION - LOCATION
LOCATION: HIP
LOCATION: HIP

## 2023-02-21 ASSESSMENT — PAIN SCALES - GENERAL
PAINLEVEL_OUTOF10: 1
PAINLEVEL_OUTOF10: 1
PAINLEVEL_OUTOF10: 6
PAINLEVEL_OUTOF10: 4

## 2023-02-21 ASSESSMENT — PAIN DESCRIPTION - ORIENTATION
ORIENTATION: RIGHT
ORIENTATION: RIGHT

## 2023-02-21 ASSESSMENT — PAIN DESCRIPTION - DESCRIPTORS
DESCRIPTORS: SPASM;DISCOMFORT;SORE
DESCRIPTORS: TENDER;SORE;DISCOMFORT

## 2023-02-21 ASSESSMENT — PAIN DESCRIPTION - PAIN TYPE
TYPE: SURGICAL PAIN
TYPE: SURGICAL PAIN;ACUTE PAIN

## 2023-02-21 ASSESSMENT — PAIN DESCRIPTION - ONSET
ONSET: ON-GOING
ONSET: ON-GOING

## 2023-02-21 NOTE — CARE COORDINATION
2/21, SW was informed that patient has a positive COVID. Per nursing patient reports that she had COVID 2 weeks ago. Sanchez from [facility] informed. Patient is till able to go to facility and will be placed in room with her mother who is at the facility. [facility] says they know that patient has COVID 2 weeks ago. SW to follow.       JERRY Pereira  [facility] Case Management  [phone]

## 2023-02-21 NOTE — PROGRESS NOTES
Called N-N Report to Jimmie Parra at [facility]. [phone]   AVS/OLAF/H&P/Covid/MAR Faxed to [phone]

## 2023-02-21 NOTE — DISCHARGE SUMMARY
Physician Discharge Summary     Patient ID:  Isabella Jalloh  [id number]  57 y.o.  1960    Admit date: 2/16/2023    Discharge date and time:  2/21/2023    Discharge Diagnoses: Principal Problem:    Fracture of femur, intertrochanteric, closed, right, initial encounter [facility])  Active Problems:    Other fracture of right femur, initial encounter for closed fracture [facility])  Resolved Problems:    * No resolved hospital problems. *      Consults: IP CONSULT TO ORTHOPEDIC SURGERY  IP CONSULT TO INTERNAL MEDICINE  IP CONSULT TO ANESTHESIOLOGY    Procedures: See below    Hospital Course:  Patient presented to the emergency department after falling at home with complaints of right hip and leg pain. Denies losing consciousness or hitting her head. Patient denies any chest pain, fever, chills, nausea, vomiting, headache, dizziness/lightheadedness, abdominal pain. Vital signs are within normal limits and stable although she is somewhat hypertensive with pressure 173/94. Laboratory studies were unremarkable. Imaging revealed a right proximal femur intertrochanteric nondisplaced fracture. Orthopedic service was consulted. Medicine consulted for admission. Consulted orthopedic service-status post right hip ORIF-2/17/2023-Pain management. Post op hb stable. PT/OT consulted.  Medically stable for dc pending rehab   -Right femur intertrochanteric fracture  -Fall at home  -Hypertension  -Hyperlipidemia  -Hypothyroidism  -History of multiple sclerosis        PLAN:  -Consulted orthopedic service-status post right hip ORIF-2/17/2023  -Pain management Rx signed  Bowel regimen  -PT/OT consulted  - post op hb- 9.8--->10.1,stable  -Continue home medications        Discharge Exam:  See progress note from today    Condition:  Stable    Disposition: SNF    Patient Instructions:   Current Discharge Medication List        START taking these medications    Details   oxyCODONE-acetaminophen (PERCOCET) 5-325 MG per tablet Take 1 tablet by mouth every 6 hours as needed for Pain for up to 7 days. Intended supply: 7 days. Take lowest dose possible to manage pain Max Daily Amount: 4 tablets  Qty: 5 tablet, Refills: 0    Comments: Reduce doses taken as pain becomes manageable  Associated Diagnoses: Fracture of femur, intertrochanteric, closed, right, initial encounter ([facility]      sennosides-docusate sodium (SENOKOT-S) 8.6-50 MG tablet Take 1 tablet by mouth in the morning and at bedtime  Qty: 30 tablet, Refills: 0      aspirin EC 81 MG EC tablet Take 1 tablet by mouth 2 times daily for 28 days  Qty: 56 tablet, Refills: 0           CONTINUE these medications which have NOT CHANGED    Details   Semaglutide (OZEMPIC, 0.25 OR 0.5 MG/DOSE, SC) Inject into the skin Once weekly. losartan (COZAAR) 50 MG tablet Take 50 mg by mouth daily      levothyroxine (SYNTHROID) 50 MCG tablet Take 50 mcg by mouth Daily      baclofen (LIORESAL) 10 MG tablet Take 10 mg by mouth 3 times daily      BIOTIN 5000 PO Take by mouth      vitamin B-6 (PYRIDOXINE) 100 MG tablet Take 100 mg by mouth daily      Cholecalciferol (D3 ADULT PO) Take by mouth      MECLIZINE HCL PO Take 12.5 mg by mouth       Propranolol HCl (INDERAL PO) Take 80 mg by mouth       Ocrelizumab (OCREVUS IV) Infuse intravenously      buPROPion (WELLBUTRIN SR) 150 MG extended release tablet Take 150 mg by mouth 2 times daily      modafinil (PROVIGIL) 200 MG tablet Take 200 mg by mouth 2 times daily      acetaminophen 650 MG TABS Take 650 mg by mouth every 4 hours as needed for Pain (For mild pain level 1-3 or for fever > 100.5). Qty: 120 tablet      amantadine (SYMMETREL) 100 MG capsule Take 100 mg by mouth 2 times daily.              Activity: activity as tolerated  Diet: cardiac diet    Follow-up with 1wkPCP, 2 wks ortho    Note that over 30 minutes was spent in preparing discharge papers, discussing discharge with patient, staff, consultants, medication review, arranging follow up, etc.    Signed:  Ulysses Soriano Priyanka Santiago MD  2/21/2023  1:27 PM

## 2023-02-21 NOTE — CARE COORDINATION
2/21, SW spoke with Skinny Ashraf from [facility] and precert has been obtained. [facility] for a 3pm  today for patient to go to [facility]. PAS/RR, ambulance form and face sheet along with envelope on soft chart. COVID test being done presently. Patient, nursing and Skinny Ashraf from [facility] informed of  time. Patient to call her own son on her going to facility. SW to follow.       JERRY Montoya  [facility] Case Management  [phone]

## 2023-02-21 NOTE — PROGRESS NOTES
Hospitalist Progress Note      PCP: Calli Biswas MD    Date of Admission: 2/16/2023    Chief Complaint: fall, right femur intertroch fracture    Hospital Course:    Patient presented to the emergency department after falling at home. Now with complaints of right hip and leg pain. Denies losing consciousness or hitting her head. Patient denies any chest pain, fever, chills, nausea, vomiting, headache, dizziness/lightheadedness, abdominal pain. Vital signs are within normal limits and stable although she is somewhat hypertensive with pressure 173/94. Laboratory studies were unremarkable. Imaging revealed a right proximal femur intertrochanteric nondisplaced fracture. Orthopedic service was consulted. Medicine consulted for admission. Consulted orthopedic service-status post right hip ORIF-2/17/2023-Pain management. Post op hb stable. PT/OT consulted.  Medically stable for dc pending rehab    Subjective:   Feeling better no C/O  No CP or SOB  No fever or chills   No uncontrolled pain  No vomiting or diarrhea   No events reported overnight  Chart reviewed     Medications:  Reviewed    Infusion Medications    sodium chloride       Scheduled Medications    enoxaparin  40 mg SubCUTAneous Daily    amantadine  100 mg Oral BID    baclofen  10 mg Oral TID    buPROPion  150 mg Oral BID    levothyroxine  50 mcg Oral Daily    losartan  50 mg Oral Daily    sodium chloride flush  10 mL IntraVENous 2 times per day    modafinil  200 mg Oral BID    aspirin  81 mg Oral BID    gabapentin  100 mg Oral TID     PRN Meds: bisacodyl, oxyCODONE-acetaminophen, fentanNYL, sodium chloride flush, sodium chloride, promethazine **OR** ondansetron, polyethylene glycol, acetaminophen **OR** acetaminophen      Intake/Output Summary (Last 24 hours) at 2/21/2023 1114  Last data filed at 2/20/2023 1754  Gross per 24 hour   Intake 300 ml   Output --   Net 300 ml         Exam:    BP (!) 136/98   Pulse 67   Temp 97.9 °F (36.6 °C) (Temporal)   Resp 18   Ht 4' 11\" (1.499 m)   Wt 95 lb (43.1 kg)   SpO2 97%   BMI 19.19 kg/m²     General appearance: No apparent distress, appears stated age and cooperative. HEENT: Pupils equal, round, and reactive to light. Conjunctivae/corneas clear. Neck: Supple, with full range of motion. No jugular venous distention. Trachea midline. Respiratory:  Normal respiratory effort. Clear to auscultation, bilaterally without Rales/Wheezes/Rhonchi. Cardiovascular: Regular rate and rhythm with normal S1/S2 without murmurs, rubs or gallops. Abdomen: Soft, non-tender, non-distended with normal bowel sounds. Musculoskeletal: No clubbing, cyanosis or edema bilaterally. Decreased range of motion in the right hip secondary to pain  Skin: Skin color, texture, turgor normal.  No rashes or lesions. Dressings intact  Neurologic: No acute focal deficits  Psychiatric: Alert and oriented, thought content appropriate, normal insight    Labs:   Recent Labs     02/19/23  0609   WBC 8.6   HGB 10.1*   HCT 31.5*          Recent Labs     02/19/23  0609 02/20/23  0558    139   K 5.0 4.6   CL 99 102   CO2 27 30*   BUN 19 17   CREATININE 0.7 0.7   CALCIUM 9.2 9.0       No results for input(s): AST, ALT, BILIDIR, BILITOT, ALKPHOS in the last 72 hours. No results for input(s): INR in the last 72 hours. No results for input(s): Faisal Wasserman in the last 72 hours.     Assessment/Plan:    Active Hospital Problems    Diagnosis Date Noted    Fracture of femur, intertrochanteric, closed, right[facility]     Priority: Medium    Other fracture of right femur, initial encounter for closed fracture [facility]) [S72.8X1A] 02/16/2023     Priority: Medium     -Right femur intertrochanteric fracture  -Fall at home  -Hypertension  -Hyperlipidemia  -Hypothyroidism  -History of multiple sclerosis        PLAN:  -Consulted orthopedic service-status post right hip ORIF-2/17/2023  -Pain management  Bowel regimen  -PT/OT consulted  - post op hb- 9.8--->10.1,stable  -Continue home medications      DVT Prophylaxis: Lovenox  Diet: ADULT DIET; Regular;  Low Sodium (2 gm)  Code Status: Full Code    PT/OT Eval Status: Consulted    Dispo -status post ORIF, PT/OT,   Medically stable for SNF    Rockford Saint, MD

## 2023-03-02 ENCOUNTER — OFFICE VISIT (OUTPATIENT)
Dept: ORTHOPEDIC SURGERY | Age: 63
End: 2023-03-02

## 2023-03-02 ENCOUNTER — TELEPHONE (OUTPATIENT)
Dept: ORTHOPEDIC SURGERY | Age: 63
End: 2023-03-02

## 2023-03-02 DIAGNOSIS — S72.001A CLOSED FRACTURE OF RIGHT HIP, INITIAL ENCOUNTER (HCC): Primary | ICD-10-CM

## 2023-03-02 PROCEDURE — 99024 POSTOP FOLLOW-UP VISIT: CPT | Performed by: STUDENT IN AN ORGANIZED HEALTH CARE EDUCATION/TRAINING PROGRAM

## 2023-03-02 NOTE — PROGRESS NOTES
Follow Up Post Operative Visit     Surgery: Right hip open reduction internal fixation with cephalomedullary nail  Date: 2/17/2023    Subjective:    Amanda Mohamud is here for follow up visit s/p above procedure. She is doing well. She has been ambulating with little difficulty. Her pain is well controlled. She is taking her aspirin for DVT prophylaxis. She is happy with the result so far. Controlled Substances Monitoring:        Physical Exam:    No data recorded    General: Alert and oriented x3, no acute distress  Cardiovascular/pulmonary: No labored breathing, peripheral perfusion intact  Musculoskeletal:    Right hip incisions well approximated and staples removed. No signs of infection. She is mildly tender palpation around her hip. Thigh soft compressible. She has full knee range of motion. Imaging: 3 views of the right hip demonstrate a well-placed cephalomedullary nail without any evidence of complications    Assessment and Plan: 2-week status post right hip open reduction internal fixation with cephalomedullary nail  -She is doing well. She can continue weightbearing as tolerated and strengthening with physical therapy. Continue her pain control and aspirin for the full dose and duration of prescription. She is going to give us a call if she needs anything. Otherwise we will see her back in about 6 weeks to take another x-ray to see how she is doing. She appears to be healing appropriately and is happy with the results.     Moni Al, DO   Orthopaedic Surgery   3/2/23   11:23 AM EST

## 2023-03-03 ENCOUNTER — TELEPHONE (OUTPATIENT)
Dept: ORTHOPEDIC SURGERY | Age: 63
End: 2023-03-03

## 2023-03-03 NOTE — TELEPHONE ENCOUNTER
Tracy Ashraf from [place] [id number] ([phone] ext [phone]) called and wanted clarity of the Aspirin that was to be taken by the patient. She stated before the patient left the hospital she was prescribed 81 mg BID it expires on 03/22/23. I instructed her per Dr Cayden Garcia note he said continue her pain control and aspirin for the full dose and duration of prescription. She aid thank you and she will update the patient's chart.

## 2023-11-15 ENCOUNTER — APPOINTMENT (OUTPATIENT)
Dept: GENERAL RADIOLOGY | Age: 63
DRG: 059 | End: 2023-11-15
Payer: MEDICARE

## 2023-11-15 ENCOUNTER — HOSPITAL ENCOUNTER (INPATIENT)
Age: 63
LOS: 4 days | Discharge: HOME | DRG: 059 | End: 2023-11-20
Attending: EMERGENCY MEDICINE | Admitting: INTERNAL MEDICINE
Payer: MEDICARE

## 2023-11-15 DIAGNOSIS — R29.90 STROKE-LIKE SYMPTOMS: Primary | ICD-10-CM

## 2023-11-15 DIAGNOSIS — G35 MULTIPLE SCLEROSIS (HCC): ICD-10-CM

## 2023-11-15 LAB
BASOPHILS # BLD: 0.05 K/UL (ref 0–0.2)
BASOPHILS NFR BLD: 1 % (ref 0–2)
EOSINOPHIL # BLD: 0.08 K/UL (ref 0.05–0.5)
EOSINOPHILS RELATIVE PERCENT: 2 % (ref 0–6)
ERYTHROCYTE [DISTWIDTH] IN BLOOD BY AUTOMATED COUNT: 12.7 % (ref 11.5–15)
HCT VFR BLD AUTO: 38.5 % (ref 34–48)
HGB BLD-MCNC: 12.5 G/DL (ref 11.5–15.5)
IMM GRANULOCYTES # BLD AUTO: <0.03 K/UL (ref 0–0.58)
IMM GRANULOCYTES NFR BLD: 0 % (ref 0–5)
INR PPP: 1
LYMPHOCYTES NFR BLD: 0.96 K/UL (ref 1.5–4)
LYMPHOCYTES RELATIVE PERCENT: 23 % (ref 20–42)
MCH RBC QN AUTO: 31.4 PG (ref 26–35)
MCHC RBC AUTO-ENTMCNC: 32.5 G/DL (ref 32–34.5)
MCV RBC AUTO: 96.7 FL (ref 80–99.9)
MONOCYTES NFR BLD: 0.42 K/UL (ref 0.1–0.95)
MONOCYTES NFR BLD: 10 % (ref 2–12)
NEUTROPHILS NFR BLD: 64 % (ref 43–80)
NEUTS SEG NFR BLD: 2.74 K/UL (ref 1.8–7.3)
PLATELET # BLD AUTO: 198 K/UL (ref 130–450)
PLATELET CONFIRMATION: NORMAL
PMV BLD AUTO: 10.7 FL (ref 7–12)
PROTHROMBIN TIME: 10.5 SEC (ref 9.3–12.4)
RBC # BLD AUTO: 3.98 M/UL (ref 3.5–5.5)
WBC OTHER # BLD: 4.3 K/UL (ref 4.5–11.5)

## 2023-11-15 PROCEDURE — 99285 EMERGENCY DEPT VISIT HI MDM: CPT

## 2023-11-15 PROCEDURE — 93005 ELECTROCARDIOGRAM TRACING: CPT | Performed by: EMERGENCY MEDICINE

## 2023-11-15 PROCEDURE — 85610 PROTHROMBIN TIME: CPT

## 2023-11-15 PROCEDURE — 80053 COMPREHEN METABOLIC PANEL: CPT

## 2023-11-15 PROCEDURE — 84484 ASSAY OF TROPONIN QUANT: CPT

## 2023-11-15 PROCEDURE — 85025 COMPLETE CBC W/AUTO DIFF WBC: CPT

## 2023-11-15 PROCEDURE — 83690 ASSAY OF LIPASE: CPT

## 2023-11-15 ASSESSMENT — PAIN - FUNCTIONAL ASSESSMENT: PAIN_FUNCTIONAL_ASSESSMENT: NONE - DENIES PAIN

## 2023-11-16 ENCOUNTER — APPOINTMENT (OUTPATIENT)
Dept: CT IMAGING | Age: 63
DRG: 059 | End: 2023-11-16
Payer: MEDICARE

## 2023-11-16 ENCOUNTER — APPOINTMENT (OUTPATIENT)
Dept: GENERAL RADIOLOGY | Age: 63
DRG: 059 | End: 2023-11-16
Payer: MEDICARE

## 2023-11-16 PROBLEM — G35 MULTIPLE SCLEROSIS WITH ACUTE NEUROLOGIC EVENT (HCC): Status: ACTIVE | Noted: 2023-11-16

## 2023-11-16 PROBLEM — R26.89 BALANCE DISORDER: Status: ACTIVE | Noted: 2023-11-16

## 2023-11-16 LAB
ALBUMIN SERPL-MCNC: 4.6 G/DL (ref 3.5–5.2)
ALP SERPL-CCNC: 113 U/L (ref 35–104)
ALT SERPL-CCNC: 49 U/L (ref 0–32)
ANION GAP SERPL CALCULATED.3IONS-SCNC: 11 MMOL/L (ref 7–16)
AST SERPL-CCNC: 39 U/L (ref 0–31)
BACTERIA URNS QL MICRO: ABNORMAL
BILIRUB SERPL-MCNC: 0.3 MG/DL (ref 0–1.2)
BILIRUB UR QL STRIP: NEGATIVE
BUN SERPL-MCNC: 17 MG/DL (ref 6–23)
CALCIUM SERPL-MCNC: 9.1 MG/DL (ref 8.6–10.2)
CHLORIDE SERPL-SCNC: 107 MMOL/L (ref 98–107)
CLARITY UR: CLEAR
CO2 SERPL-SCNC: 24 MMOL/L (ref 22–29)
COLOR UR: YELLOW
CREAT SERPL-MCNC: 0.9 MG/DL (ref 0.5–1)
EKG ATRIAL RATE: 55 BPM
EKG P AXIS: 78 DEGREES
EKG P-R INTERVAL: 174 MS
EKG Q-T INTERVAL: 454 MS
EKG QRS DURATION: 90 MS
EKG QTC CALCULATION (BAZETT): 434 MS
EKG R AXIS: 67 DEGREES
EKG T AXIS: 59 DEGREES
EKG VENTRICULAR RATE: 55 BPM
EPI CELLS #/AREA URNS HPF: ABNORMAL /HPF
GFR SERPL CREATININE-BSD FRML MDRD: >60 ML/MIN/1.73M2
GLUCOSE SERPL-MCNC: 76 MG/DL (ref 74–99)
GLUCOSE UR STRIP-MCNC: NEGATIVE MG/DL
HGB UR QL STRIP.AUTO: NEGATIVE
KETONES UR STRIP-MCNC: NEGATIVE MG/DL
LEUKOCYTE ESTERASE UR QL STRIP: ABNORMAL
LIPASE SERPL-CCNC: 41 U/L (ref 13–60)
NITRITE UR QL STRIP: NEGATIVE
PH UR STRIP: 6.5 [PH] (ref 5–9)
POTASSIUM SERPL-SCNC: 4.8 MMOL/L (ref 3.5–5)
PROT SERPL-MCNC: 7.1 G/DL (ref 6.4–8.3)
PROT UR STRIP-MCNC: NEGATIVE MG/DL
RBC #/AREA URNS HPF: ABNORMAL /HPF
SODIUM SERPL-SCNC: 142 MMOL/L (ref 132–146)
SP GR UR STRIP: 1.02 (ref 1–1.03)
TROPONIN I SERPL HS-MCNC: 12 NG/L (ref 0–9)
TROPONIN I SERPL HS-MCNC: 14 NG/L (ref 0–9)
UROBILINOGEN UR STRIP-ACNC: 0.2 EU/DL (ref 0–1)
WBC #/AREA URNS HPF: ABNORMAL /HPF

## 2023-11-16 PROCEDURE — 93010 ELECTROCARDIOGRAM REPORT: CPT | Performed by: INTERNAL MEDICINE

## 2023-11-16 PROCEDURE — 84484 ASSAY OF TROPONIN QUANT: CPT

## 2023-11-16 PROCEDURE — 6360000002 HC RX W HCPCS: Performed by: INTERNAL MEDICINE

## 2023-11-16 PROCEDURE — 6370000000 HC RX 637 (ALT 250 FOR IP): Performed by: INTERNAL MEDICINE

## 2023-11-16 PROCEDURE — 2580000003 HC RX 258: Performed by: EMERGENCY MEDICINE

## 2023-11-16 PROCEDURE — 6360000002 HC RX W HCPCS: Performed by: EMERGENCY MEDICINE

## 2023-11-16 PROCEDURE — 70450 CT HEAD/BRAIN W/O DYE: CPT

## 2023-11-16 PROCEDURE — 81001 URINALYSIS AUTO W/SCOPE: CPT

## 2023-11-16 PROCEDURE — 2580000003 HC RX 258: Performed by: INTERNAL MEDICINE

## 2023-11-16 PROCEDURE — 71045 X-RAY EXAM CHEST 1 VIEW: CPT

## 2023-11-16 PROCEDURE — 99222 1ST HOSP IP/OBS MODERATE 55: CPT | Performed by: PSYCHIATRY & NEUROLOGY

## 2023-11-16 PROCEDURE — 1200000000 HC SEMI PRIVATE

## 2023-11-16 PROCEDURE — 99222 1ST HOSP IP/OBS MODERATE 55: CPT | Performed by: INTERNAL MEDICINE

## 2023-11-16 RX ORDER — NORTRIPTYLINE HYDROCHLORIDE 25 MG/1
25 CAPSULE ORAL NIGHTLY
COMMUNITY
Start: 2023-11-15

## 2023-11-16 RX ORDER — LORAZEPAM 2 MG/ML
1 INJECTION INTRAMUSCULAR PRN
Status: DISCONTINUED | OUTPATIENT
Start: 2023-11-16 | End: 2023-11-20 | Stop reason: HOSPADM

## 2023-11-16 RX ORDER — POTASSIUM CHLORIDE 7.45 MG/ML
10 INJECTION INTRAVENOUS PRN
Status: DISCONTINUED | OUTPATIENT
Start: 2023-11-16 | End: 2023-11-20 | Stop reason: HOSPADM

## 2023-11-16 RX ORDER — SODIUM CHLORIDE 0.9 % (FLUSH) 0.9 %
5-40 SYRINGE (ML) INJECTION PRN
Status: DISCONTINUED | OUTPATIENT
Start: 2023-11-16 | End: 2023-11-20 | Stop reason: HOSPADM

## 2023-11-16 RX ORDER — ACETAMINOPHEN 325 MG/1
650 TABLET ORAL EVERY 6 HOURS PRN
Status: DISCONTINUED | OUTPATIENT
Start: 2023-11-16 | End: 2023-11-20 | Stop reason: HOSPADM

## 2023-11-16 RX ORDER — AMANTADINE HYDROCHLORIDE 100 MG/1
100 CAPSULE, GELATIN COATED ORAL DAILY
Status: DISCONTINUED | OUTPATIENT
Start: 2023-11-16 | End: 2023-11-20 | Stop reason: HOSPADM

## 2023-11-16 RX ORDER — ENOXAPARIN SODIUM 100 MG/ML
30 INJECTION SUBCUTANEOUS DAILY
Status: DISCONTINUED | OUTPATIENT
Start: 2023-11-16 | End: 2023-11-20 | Stop reason: HOSPADM

## 2023-11-16 RX ORDER — BUPROPION HYDROCHLORIDE 300 MG/1
300 TABLET ORAL DAILY
Status: DISCONTINUED | OUTPATIENT
Start: 2023-11-16 | End: 2023-11-16

## 2023-11-16 RX ORDER — LEVOTHYROXINE SODIUM 0.05 MG/1
50 TABLET ORAL DAILY
Status: DISCONTINUED | OUTPATIENT
Start: 2023-11-16 | End: 2023-11-20 | Stop reason: HOSPADM

## 2023-11-16 RX ORDER — POTASSIUM CHLORIDE 20 MEQ/1
40 TABLET, EXTENDED RELEASE ORAL PRN
Status: DISCONTINUED | OUTPATIENT
Start: 2023-11-16 | End: 2023-11-20 | Stop reason: HOSPADM

## 2023-11-16 RX ORDER — SODIUM CHLORIDE 9 MG/ML
INJECTION, SOLUTION INTRAVENOUS PRN
Status: DISCONTINUED | OUTPATIENT
Start: 2023-11-16 | End: 2023-11-20 | Stop reason: HOSPADM

## 2023-11-16 RX ORDER — SODIUM CHLORIDE 0.9 % (FLUSH) 0.9 %
5-40 SYRINGE (ML) INJECTION EVERY 12 HOURS SCHEDULED
Status: DISCONTINUED | OUTPATIENT
Start: 2023-11-16 | End: 2023-11-20 | Stop reason: HOSPADM

## 2023-11-16 RX ORDER — ONDANSETRON 4 MG/1
4 TABLET, ORALLY DISINTEGRATING ORAL EVERY 8 HOURS PRN
Status: DISCONTINUED | OUTPATIENT
Start: 2023-11-16 | End: 2023-11-20 | Stop reason: HOSPADM

## 2023-11-16 RX ORDER — OLANZAPINE 20 MG/1
20 TABLET ORAL DAILY
COMMUNITY
Start: 2023-11-06

## 2023-11-16 RX ORDER — ACETAMINOPHEN 650 MG/1
650 SUPPOSITORY RECTAL EVERY 6 HOURS PRN
Status: DISCONTINUED | OUTPATIENT
Start: 2023-11-16 | End: 2023-11-20 | Stop reason: HOSPADM

## 2023-11-16 RX ORDER — ONDANSETRON 2 MG/ML
4 INJECTION INTRAMUSCULAR; INTRAVENOUS EVERY 6 HOURS PRN
Status: DISCONTINUED | OUTPATIENT
Start: 2023-11-16 | End: 2023-11-20 | Stop reason: HOSPADM

## 2023-11-16 RX ORDER — BACLOFEN 10 MG/1
10 TABLET ORAL 3 TIMES DAILY
Status: DISCONTINUED | OUTPATIENT
Start: 2023-11-16 | End: 2023-11-16

## 2023-11-16 RX ORDER — POLYETHYLENE GLYCOL 3350 17 G/17G
17 POWDER, FOR SOLUTION ORAL DAILY PRN
Status: DISCONTINUED | OUTPATIENT
Start: 2023-11-16 | End: 2023-11-20 | Stop reason: HOSPADM

## 2023-11-16 RX ORDER — MAGNESIUM SULFATE IN WATER 40 MG/ML
2000 INJECTION, SOLUTION INTRAVENOUS PRN
Status: DISCONTINUED | OUTPATIENT
Start: 2023-11-16 | End: 2023-11-20 | Stop reason: HOSPADM

## 2023-11-16 RX ORDER — BUPROPION HYDROCHLORIDE 300 MG/1
300 TABLET ORAL DAILY
Status: DISCONTINUED | OUTPATIENT
Start: 2023-11-17 | End: 2023-11-20 | Stop reason: HOSPADM

## 2023-11-16 RX ADMIN — WATER 1000 MG: 1 INJECTION INTRAMUSCULAR; INTRAVENOUS; SUBCUTANEOUS at 06:40

## 2023-11-16 RX ADMIN — METHYLPREDNISOLONE SODIUM SUCCINATE 1000 MG: 1 INJECTION INTRAMUSCULAR; INTRAVENOUS at 05:53

## 2023-11-16 RX ADMIN — BUPROPION HYDROCHLORIDE 300 MG: 300 TABLET, FILM COATED, EXTENDED RELEASE ORAL at 08:46

## 2023-11-16 RX ADMIN — ACETAMINOPHEN 650 MG: 325 TABLET ORAL at 14:45

## 2023-11-16 RX ADMIN — AMANTADINE HYDROCHLORIDE 100 MG: 100 CAPSULE ORAL at 08:46

## 2023-11-16 RX ADMIN — SODIUM CHLORIDE, PRESERVATIVE FREE 10 ML: 5 INJECTION INTRAVENOUS at 08:49

## 2023-11-16 RX ADMIN — LEVOTHYROXINE SODIUM 50 MCG: 0.05 TABLET ORAL at 08:46

## 2023-11-16 RX ADMIN — SODIUM CHLORIDE, PRESERVATIVE FREE 10 ML: 5 INJECTION INTRAVENOUS at 20:09

## 2023-11-16 RX ADMIN — ENOXAPARIN SODIUM 30 MG: 100 INJECTION SUBCUTANEOUS at 09:54

## 2023-11-16 ASSESSMENT — ENCOUNTER SYMPTOMS: RESPIRATORY NEGATIVE: 1

## 2023-11-16 ASSESSMENT — PAIN SCALES - GENERAL
PAINLEVEL_OUTOF10: 3
PAINLEVEL_OUTOF10: 8

## 2023-11-16 ASSESSMENT — PAIN DESCRIPTION - DESCRIPTORS: DESCRIPTORS: ACHING;DISCOMFORT;TINGLING

## 2023-11-16 ASSESSMENT — PAIN DESCRIPTION - ORIENTATION: ORIENTATION: RIGHT;LEFT

## 2023-11-16 ASSESSMENT — PAIN DESCRIPTION - LOCATION: LOCATION: LEG

## 2023-11-16 NOTE — ED PROVIDER NOTES
Sai        Pt Name: Livia Caal  MRN: [id number]  [address] [facility] 1960  Date of evaluation: 11/15/2023  Provider: Leonel Rucker DO  PCP: Sagrario Sterling MD  Note Started: 1:11 AM EST 11/16/23    CHIEF COMPLAINT       Chief Complaint   Patient presents with    Aphasia     (Woke up this morning with slurred speech, history of MS)       HISTORY OF PRESENT ILLNESS: 1 or more Elements   History From: Patient    Limitations to history : None    Livia Caal is a 70 y.o. female who presents to this emergency department for slurred speech. The patient states that yesterday she went to bed in normal health but when she woke up this morning she noticed that her speech was off. She states that her speech has been slurred throughout the entire day. She states her sister came to visit her tonight and told her to come be evaluated in the emergency department. Patient does have a history of MS and does have weakness in bilateral lower extremities and does not ambulate. She does have numbness and tingling but this is unchanged from her baseline. No chest pain no shortness of breath. No abdominal pain. Nursing Notes were all reviewed and agreed with or any disagreements were addressed in the HPI. REVIEW OF EXTERNAL NOTE :       PDMP Monitoring:    Last PDMP Donte as Reviewed:  Review User Review Instant Review Result            Urine Drug Screenings (1 yr)       Serum Drug Screen  Collected: 4/21/2017 11:45 AM (Final result)                  Medication Contract and Consent for Opioid Use Documents Filed        No documents found                      REVIEW OF SYSTEMS :           Positives and Pertinent negatives as per HPI.      SURGICAL HISTORY     Past Surgical History:   Procedure Laterality Date    HIP FRACTURE SURGERY Right 2/17/2023    HIP OPEN REDUCTION INTERNAL FIXATION-RIGHT performed by Shayna Chatterjee

## 2023-11-16 NOTE — H&P
History & Physicial  11/16/23  Primary Care:  Yohana Bangura MD  [address] [place] [address] [facility] [address]        Chief Complaint   Patient presents with    Aphasia     (Woke up this morning with slurred speech, history of MS)       HPI:    -year-old female with known history of multiple sclerosis presents to the department after having difficulty with vision for the last few days. Over the course of the week she has also noticed some decreased ability to walk. She only walks with a walker but this has become very difficult. She denies chest pain. She denies shortness of breath. She admits to having difficulty having a daily bowel movement      Prior to Visit Medications    Medication Sig Taking? Authorizing Provider   nortriptyline (PAMELOR) 25 MG capsule Take 1 capsule by mouth at bedtime  Florencia Ramsey MD   OLANZapine (ZYPREXA) 20 MG tablet Take 1 tablet by mouth daily  Florencia Ramsey MD   Amantadine (SYMMETREL) 100 MG TABS tablet Take 100 mg by mouth 2 times daily  Florencia Ramsey MD   buPROPion (WELLBUTRIN XL) 150 MG extended release tablet Take 2 tablets by mouth daily  Florencia Ramsey MD   LINZESS 72 MCG CAPS capsule TAKE 1 CAPSULE BY MOUTH ONCE DAILY ON AN EMPTY STOMACH AT LEAST 30 MINUTES BEFORE 1ST MEAL OF THE DAY  Florencia Ramsey MD   lubiprostone (AMITIZA) 8 MCG CAPS capsule TAKE 1 CAPSULE BY MOUTH TWICE DAILY WITH FOOD AND WATER  Florencia Ramsey MD   tiZANidine (ZANAFLEX) 2 MG tablet Take 1 tablet by mouth 2 times daily  Florencia Ramsey MD   sennosides-docusate sodium (SENOKOT-S) 8.6-50 MG tablet Take 1 tablet by mouth in the morning and at bedtime  Aric Peng MD   aspirin 325 MG EC tablet Take 1 tablet by mouth daily  Hill Tomas,    Semaglutide (OZEMPIC, 0.25 OR 0.5 MG/DOSE, SC) Inject into the skin Once weekly.   Florencia Ramsey MD   losartan (COZAAR) 50 MG tablet Take 1 tablet by mouth daily  Florencia Ramsey MD   levothyroxine

## 2023-11-16 NOTE — ED NOTES
Received report from [facility]; assumed care of pt at this time; awaiting CT and XR; will monitor     López Tam RN  11/15/23 5428

## 2023-11-17 ENCOUNTER — APPOINTMENT (OUTPATIENT)
Dept: MRI IMAGING | Age: 63
DRG: 059 | End: 2023-11-17
Payer: MEDICARE

## 2023-11-17 PROBLEM — R29.90 STROKE-LIKE SYMPTOMS: Status: ACTIVE | Noted: 2023-11-17

## 2023-11-17 PROCEDURE — 6360000002 HC RX W HCPCS: Performed by: PSYCHIATRY & NEUROLOGY

## 2023-11-17 PROCEDURE — 2580000003 HC RX 258: Performed by: INTERNAL MEDICINE

## 2023-11-17 PROCEDURE — 70551 MRI BRAIN STEM W/O DYE: CPT

## 2023-11-17 PROCEDURE — 2580000003 HC RX 258: Performed by: PSYCHIATRY & NEUROLOGY

## 2023-11-17 PROCEDURE — 99232 SBSQ HOSP IP/OBS MODERATE 35: CPT | Performed by: PHYSICIAN ASSISTANT

## 2023-11-17 PROCEDURE — 6370000000 HC RX 637 (ALT 250 FOR IP): Performed by: INTERNAL MEDICINE

## 2023-11-17 PROCEDURE — 70540 MRI ORBIT/FACE/NECK W/O DYE: CPT

## 2023-11-17 PROCEDURE — 72141 MRI NECK SPINE W/O DYE: CPT

## 2023-11-17 PROCEDURE — 1200000000 HC SEMI PRIVATE

## 2023-11-17 PROCEDURE — 6360000002 HC RX W HCPCS: Performed by: INTERNAL MEDICINE

## 2023-11-17 RX ADMIN — LEVOTHYROXINE SODIUM 50 MCG: 0.05 TABLET ORAL at 05:19

## 2023-11-17 RX ADMIN — LORAZEPAM 1 MG: 2 INJECTION INTRAMUSCULAR; INTRAVENOUS at 06:25

## 2023-11-17 RX ADMIN — SODIUM CHLORIDE, PRESERVATIVE FREE 10 ML: 5 INJECTION INTRAVENOUS at 21:22

## 2023-11-17 RX ADMIN — ENOXAPARIN SODIUM 30 MG: 100 INJECTION SUBCUTANEOUS at 10:03

## 2023-11-17 RX ADMIN — BUPROPION HYDROCHLORIDE 300 MG: 300 TABLET, FILM COATED, EXTENDED RELEASE ORAL at 10:03

## 2023-11-17 RX ADMIN — METHYLPREDNISOLONE SODIUM SUCCINATE 1000 MG: 1 INJECTION INTRAMUSCULAR; INTRAVENOUS at 11:19

## 2023-11-17 RX ADMIN — AMANTADINE HYDROCHLORIDE 100 MG: 100 CAPSULE ORAL at 10:03

## 2023-11-17 RX ADMIN — SODIUM CHLORIDE, PRESERVATIVE FREE 10 ML: 5 INJECTION INTRAVENOUS at 12:03

## 2023-11-17 RX ADMIN — SODIUM CHLORIDE, PRESERVATIVE FREE 10 ML: 5 INJECTION INTRAVENOUS at 10:04

## 2023-11-17 RX ADMIN — ACETAMINOPHEN 650 MG: 325 TABLET ORAL at 21:22

## 2023-11-17 ASSESSMENT — PAIN - FUNCTIONAL ASSESSMENT: PAIN_FUNCTIONAL_ASSESSMENT: 0-10

## 2023-11-17 ASSESSMENT — PAIN SCALES - GENERAL
PAINLEVEL_OUTOF10: 0
PAINLEVEL_OUTOF10: 7

## 2023-11-17 ASSESSMENT — PAIN DESCRIPTION - ORIENTATION: ORIENTATION: RIGHT;LEFT

## 2023-11-17 ASSESSMENT — PAIN DESCRIPTION - LOCATION: LOCATION: FOOT;ARM;LEG

## 2023-11-17 NOTE — PROCEDURES
Patient could not tolerate MRI's, Brain, Orbits and CSP without done, all had motion- Patient sent back to room due to pain, moving and refusing to continue. [address] [place] [address]. Zheng Recinos to reorder contrast if he needs her to come back (with more meds) OR discontinue TSP.

## 2023-11-17 NOTE — CONSULTS
[facility]  Neurology Consult    Date:  11/16/2023  Patient Name:  Camryn Cuevas  YOB: 1960  MRN: [id number]     PCP:  Lebron Charles MD   Referring:  No ref. provider found    Reason for Consult: Multiple Sclerosis Flare, Aphasia     Chief Complaint: \"Slurred speech\" Aphasia    History obtained from: Patient and Chart     Assessment  Camryn Cuevas is a 70 y.o. female with a PMH significant for Multiple sclerosis diagnosed in 1997, CHF, Leukopenia, COPD, Diabetes, Depression, Anxiety, Hysterectomy, right hip open reduction internal fixation, previous craniectomy and Ventriculocisternostomy who presented for evaluation of slurred speech in the setting of a history of MS. Aphasia 2/2 to Multiple Sclerosis Flare vs TIA   - Symptoms lasted full day with no worsening in severity   - No Intracranial abnormality on CT Head   - Symptoms resolved with Solumedrol IV administration   - Patient lost her mother recently and has been dealing with the stress from it     Plan  MRI Brain w and w/o Contrast   MRI Thoracic Spine w/o contrast  MRI Cervical Spine w/o contrast  MRI Orbits Face neck w and w/o contrast  Continue Solumedrol IV for 3 Days total (Day 2 tomorrow)   Follow up with outpatient neurology to restart MS therapy  Consider outpatient therapy for recent loss of mother and management of responsibilities related to her passing. History of Present Illness:  Camryn Cuevas is a 70 y.o. right handed female with a PMH significant for Multiple sclerosis diagnosed in 1997, CHF, Leukopenia, COPD, Diabetes, Depression, Anxiety, Hysterectomy, right hip open reduction internal fixation, previous craniectomy and Ventriculocisternostomy who presented for evaluation of slurred speech in the setting of a history of MS. Patient's LKW was night of 11/15. Patient states that she was at home and woke up in the morning with slurred speech that lasted throughout the day.  At night her

## 2023-11-17 NOTE — CARE COORDINATION
11/17. Met with the pt and her sister at the bedside to discuss transition of care. The pt lives alone. Her bedroom is on the first floor now, but she has a chair lift for the basement and 2nd floor of her home. Her PCP is Dr Nelson Eric. She will return home when medically stable. She has outpt MRI scheduled and wants to keep her appointment. She is scheduled for 2 more doses of daily IV solumedrol. Her family will provide transportation home.  Jose E Davis RN

## 2023-11-17 NOTE — PROGRESS NOTES
4 Eyes Skin Assessment     NAME:  Chloé Harris  YOB: 1960  MEDICAL RECORD NUMBER:  [id number]    The patient is being assessed for  Admission    I agree that at least one RN has performed a thorough Head to Toe Skin Assessment on the patient. ALL assessment sites listed below have been assessed. Areas assessed by both nurses:    Head, Face, Ears, Shoulders, Back, Chest, Arms, Elbows, Hands, Sacrum. Buttock, Coccyx, Ischium, and Legs. Feet and Heels        Does the Patient have a Wound?  No noted wound(s)there is a scab on left lateral ankle       Lon Prevention initiated by RN: No  Wound Care Orders initiated by RN: No    Pressure Injury (Stage 3,4, Unstageable, DTI, NWPT, and Complex wounds) if present, place Wound referral order by RN under : No    New Ostomies, if present place, Ostomy referral order under : No     Nurse 1 eSignature: Electronically signed by Morales Jauregui RN on 11/16/23 at 10:06 AM EST    **SHARE this note so that the co-signing nurse can place an eSignature**    Nurse 2 eSignature: Electronically signed by Roque Llamas RN on 11/16/23 at 11:07 AM EST
unchanged. Only minimal   increased FLAIR signal abnormality is identified within the white matter   adjacent to the right frontal horn. Suboptimal evaluation of the optic nerves due to patient motion artifact   without evidence of gross demyelination. MRI BRAIN WO CONTRAST   Preliminary Result   Moderate number of demyelinating plaques within the periventricular white   matter of both cerebral hemispheres that are largely unchanged. Only minimal   increased FLAIR signal abnormality is identified within the white matter   adjacent to the right frontal horn. Suboptimal evaluation of the optic nerves due to patient motion artifact   without evidence of gross demyelination. CT Head W/O Contrast   Final Result   No acute intracranial abnormality. XR CHEST PORTABLE   Final Result   No acute process. MRI CERVICAL SPINE WO CONTRAST    (Results Pending)      I personally reviewed MRI brain, MRI C spine WO - moderate to heavy lesion burden. No diffusion restriction to suggest acute stroke. Unable to assess for active MS lesions- no contrast obtained. Compared to MRI brain from 2015 there appears to be some progression.      Electronically signed by DEBRA Layne on 11/17/2023 at 10:17 AM

## 2023-11-18 PROCEDURE — 6360000002 HC RX W HCPCS: Performed by: PSYCHIATRY & NEUROLOGY

## 2023-11-18 PROCEDURE — 1200000000 HC SEMI PRIVATE

## 2023-11-18 PROCEDURE — 2580000003 HC RX 258: Performed by: PSYCHIATRY & NEUROLOGY

## 2023-11-18 PROCEDURE — 2580000003 HC RX 258: Performed by: INTERNAL MEDICINE

## 2023-11-18 PROCEDURE — 6370000000 HC RX 637 (ALT 250 FOR IP): Performed by: INTERNAL MEDICINE

## 2023-11-18 PROCEDURE — 6360000002 HC RX W HCPCS: Performed by: INTERNAL MEDICINE

## 2023-11-18 RX ADMIN — METHYLPREDNISOLONE SODIUM SUCCINATE 1000 MG: 1 INJECTION INTRAMUSCULAR; INTRAVENOUS at 09:17

## 2023-11-18 RX ADMIN — ENOXAPARIN SODIUM 30 MG: 100 INJECTION SUBCUTANEOUS at 09:15

## 2023-11-18 RX ADMIN — BUPROPION HYDROCHLORIDE 300 MG: 300 TABLET, FILM COATED, EXTENDED RELEASE ORAL at 09:15

## 2023-11-18 RX ADMIN — SODIUM CHLORIDE, PRESERVATIVE FREE 10 ML: 5 INJECTION INTRAVENOUS at 09:14

## 2023-11-18 RX ADMIN — POLYETHYLENE GLYCOL 3350 17 G: 17 POWDER, FOR SOLUTION ORAL at 13:11

## 2023-11-18 RX ADMIN — ACETAMINOPHEN 650 MG: 325 TABLET ORAL at 14:59

## 2023-11-18 RX ADMIN — LEVOTHYROXINE SODIUM 50 MCG: 0.05 TABLET ORAL at 05:45

## 2023-11-18 RX ADMIN — SODIUM CHLORIDE, PRESERVATIVE FREE 10 ML: 5 INJECTION INTRAVENOUS at 21:32

## 2023-11-18 RX ADMIN — AMANTADINE HYDROCHLORIDE 100 MG: 100 CAPSULE ORAL at 09:15

## 2023-11-18 ASSESSMENT — PAIN DESCRIPTION - DESCRIPTORS: DESCRIPTORS: CRAMPING;ACHING;DISCOMFORT

## 2023-11-18 ASSESSMENT — PAIN DESCRIPTION - LOCATION: LOCATION: LEG

## 2023-11-18 ASSESSMENT — PAIN SCALES - GENERAL
PAINLEVEL_OUTOF10: 0
PAINLEVEL_OUTOF10: 8

## 2023-11-18 ASSESSMENT — PAIN DESCRIPTION - ORIENTATION: ORIENTATION: LEFT;RIGHT

## 2023-11-18 NOTE — PLAN OF CARE
Problem: Chronic Conditions and Co-morbidities  Goal: Patient's chronic conditions and co-morbidity symptoms are monitored and maintained or improved  11/18/2023 1050 by Tamela Ahmadi RN  Outcome: Progressing     Problem: Discharge Planning  Goal: Discharge to home or other facility with appropriate resources  11/18/2023 1050 by Tamela Ahmadi RN  Outcome: Progressing     Problem: Skin/Tissue Integrity  Goal: Absence of new skin breakdown  Description: 1. Monitor for areas of redness and/or skin breakdown  2. Assess vascular access sites hourly  3. Every 4-6 hours minimum:  Change oxygen saturation probe site  4. Every 4-6 hours:  If on nasal continuous positive airway pressure, respiratory therapy assess nares and determine need for appliance change or resting period.   11/18/2023 1050 by Mynor Matos RN  Outcome: Progressing     Problem: Safety - Adult  Goal: Free from fall injury  11/18/2023 1050 by Tamela Ahmadi RN  Outcome: Progressing     Problem: Pain  Goal: Verbalizes/displays adequate comfort level or baseline comfort level  11/18/2023 1050 by Tamela Ahmadi RN  Outcome: Progressing

## 2023-11-18 NOTE — PLAN OF CARE
Problem: Chronic Conditions and Co-morbidities  Goal: Patient's chronic conditions and co-morbidity symptoms are monitored and maintained or improved  11/17/2023 2208 by Nilda Herring RN  Outcome: Progressing  11/17/2023 1605 by Lorie Sparks RN  Outcome: Progressing     Problem: Discharge Planning  Goal: Discharge to home or other facility with appropriate resources  11/17/2023 2208 by Nilda Herring RN  Outcome: Progressing  11/17/2023 1605 by Lorie Sparks RN  Outcome: Progressing     Problem: Skin/Tissue Integrity  Goal: Absence of new skin breakdown  Description: 1. Monitor for areas of redness and/or skin breakdown  2. Assess vascular access sites hourly  3. Every 4-6 hours minimum:  Change oxygen saturation probe site  4. Every 4-6 hours:  If on nasal continuous positive airway pressure, respiratory therapy assess nares and determine need for appliance change or resting period.   11/17/2023 2208 by Nilda Herring RN  Outcome: Progressing  11/17/2023 1605 by Lorie Sparks RN  Outcome: Progressing     Problem: Safety - Adult  Goal: Free from fall injury  11/17/2023 2208 by Nilda Herring RN  Outcome: Progressing  11/17/2023 1605 by Lorie Sparks RN  Outcome: Progressing     Problem: Pain  Goal: Verbalizes/displays adequate comfort level or baseline comfort level  11/17/2023 2208 by Nilda Herring RN  Outcome: Progressing  11/17/2023 1605 by Lorie Sparks RN  Outcome: Progressing

## 2023-11-19 PROCEDURE — 2580000003 HC RX 258: Performed by: PSYCHIATRY & NEUROLOGY

## 2023-11-19 PROCEDURE — 6360000002 HC RX W HCPCS: Performed by: INTERNAL MEDICINE

## 2023-11-19 PROCEDURE — 6370000000 HC RX 637 (ALT 250 FOR IP): Performed by: INTERNAL MEDICINE

## 2023-11-19 PROCEDURE — 1200000000 HC SEMI PRIVATE

## 2023-11-19 PROCEDURE — 2580000003 HC RX 258: Performed by: INTERNAL MEDICINE

## 2023-11-19 PROCEDURE — 6360000002 HC RX W HCPCS: Performed by: PSYCHIATRY & NEUROLOGY

## 2023-11-19 RX ADMIN — BUPROPION HYDROCHLORIDE 300 MG: 300 TABLET, FILM COATED, EXTENDED RELEASE ORAL at 09:40

## 2023-11-19 RX ADMIN — AMANTADINE HYDROCHLORIDE 100 MG: 100 CAPSULE ORAL at 09:39

## 2023-11-19 RX ADMIN — ACETAMINOPHEN 650 MG: 325 TABLET ORAL at 05:12

## 2023-11-19 RX ADMIN — METHYLPREDNISOLONE SODIUM SUCCINATE 1000 MG: 1 INJECTION INTRAMUSCULAR; INTRAVENOUS at 20:55

## 2023-11-19 RX ADMIN — SODIUM CHLORIDE, PRESERVATIVE FREE 10 ML: 5 INJECTION INTRAVENOUS at 09:40

## 2023-11-19 RX ADMIN — ENOXAPARIN SODIUM 30 MG: 100 INJECTION SUBCUTANEOUS at 09:39

## 2023-11-19 RX ADMIN — SODIUM CHLORIDE, PRESERVATIVE FREE 10 ML: 5 INJECTION INTRAVENOUS at 20:58

## 2023-11-19 RX ADMIN — LEVOTHYROXINE SODIUM 50 MCG: 0.05 TABLET ORAL at 05:45

## 2023-11-19 ASSESSMENT — PAIN - FUNCTIONAL ASSESSMENT: PAIN_FUNCTIONAL_ASSESSMENT: ACTIVITIES ARE NOT PREVENTED

## 2023-11-19 ASSESSMENT — PAIN SCALES - GENERAL
PAINLEVEL_OUTOF10: 5
PAINLEVEL_OUTOF10: 3

## 2023-11-19 ASSESSMENT — PAIN DESCRIPTION - FREQUENCY: FREQUENCY: INTERMITTENT

## 2023-11-19 ASSESSMENT — PAIN DESCRIPTION - LOCATION: LOCATION: ARM

## 2023-11-19 ASSESSMENT — PAIN DESCRIPTION - ONSET: ONSET: GRADUAL

## 2023-11-19 ASSESSMENT — PAIN DESCRIPTION - ORIENTATION: ORIENTATION: RIGHT

## 2023-11-19 ASSESSMENT — PAIN DESCRIPTION - DESCRIPTORS: DESCRIPTORS: ACHING;DULL;SPASM

## 2023-11-19 ASSESSMENT — PAIN DESCRIPTION - PAIN TYPE: TYPE: ACUTE PAIN

## 2023-11-20 VITALS
HEART RATE: 74 BPM | SYSTOLIC BLOOD PRESSURE: 151 MMHG | WEIGHT: 106 LBS | DIASTOLIC BLOOD PRESSURE: 71 MMHG | BODY MASS INDEX: 22.25 KG/M2 | TEMPERATURE: 97.4 F | HEIGHT: 58 IN | OXYGEN SATURATION: 97 % | RESPIRATION RATE: 16 BRPM

## 2023-11-20 LAB — GLUCOSE BLD-MCNC: 121 MG/DL (ref 74–99)

## 2023-11-20 PROCEDURE — 6360000002 HC RX W HCPCS: Performed by: INTERNAL MEDICINE

## 2023-11-20 PROCEDURE — 82962 GLUCOSE BLOOD TEST: CPT

## 2023-11-20 PROCEDURE — 99239 HOSP IP/OBS DSCHRG MGMT >30: CPT | Performed by: INTERNAL MEDICINE

## 2023-11-20 PROCEDURE — 6370000000 HC RX 637 (ALT 250 FOR IP): Performed by: INTERNAL MEDICINE

## 2023-11-20 RX ADMIN — BUPROPION HYDROCHLORIDE 300 MG: 300 TABLET, FILM COATED, EXTENDED RELEASE ORAL at 10:06

## 2023-11-20 RX ADMIN — AMANTADINE HYDROCHLORIDE 100 MG: 100 CAPSULE ORAL at 10:06

## 2023-11-20 RX ADMIN — ENOXAPARIN SODIUM 30 MG: 100 INJECTION SUBCUTANEOUS at 10:06

## 2023-11-20 RX ADMIN — LEVOTHYROXINE SODIUM 50 MCG: 0.05 TABLET ORAL at 05:50

## 2023-11-20 RX ADMIN — ACETAMINOPHEN 650 MG: 325 TABLET ORAL at 01:18

## 2023-11-20 ASSESSMENT — PAIN SCALES - GENERAL: PAINLEVEL_OUTOF10: 0

## 2023-11-20 NOTE — DISCHARGE SUMMARY
[facility] Physician Discharge Summary       Tmo Nova MD  [address] [address]  [phone]    Follow up        Activity level: As tolerated     Dispo: Home      Condition on discharge: Stable     Patient ID:  Alessandro Newman  [id number]  70 y.o.  1960    Admit date: 11/15/2023    Discharge date and time:  11/20/2023  2:07 PM    Admission Diagnoses: Principal Problem:    Multiple sclerosis  Active Problems:    Anxiety disorder    Chronic diastolic congestive heart failure ([address])    Multiple sclerosis with acute neurologic event (HCC)    Balance disorder    Stroke-like symptoms  Resolved Problems:    * No resolved hospital problems. *      Discharge Diagnoses: Principal Problem:    Multiple sclerosis  Active Problems:    Anxiety disorder    Chronic diastolic congestive heart failure (HCC)    Multiple sclerosis with acute neurologic event (HCC)    Balance disorder    Stroke-like symptoms  Resolved Problems:    * No resolved hospital problems. *      Consults:  IP CONSULT TO NEUROLOGY    Hospital Course:   Patient Alessandro Newman is a 70 y.o. presented with Multiple sclerosis ([address]) [G35]  Stroke-like symptoms []  -year-old female with known history of multiple sclerosis presents to the department after having difficulty with vision for the last few days. Over the course of the week she has also noticed some decreased ability to walk. She only walks with a walker but this has become very difficult. She denies chest pain. She denies shortness of breath. She admits to having difficulty having a daily bowel movement. Patient was seen and examined by me on 11/20/2023. Neurology was consulted. Patient was started on High dose steroids with Solumedrol 1000 mg for 5 days which she completed. Patient had improvement of symptoms. She was to have MRI done however she could not tolerate the MRI.  She does have outpatient MRI scheduled at [facility]

## 2023-11-20 NOTE — ADT AUTH CERT
hemispheres that are largely unchanged. Only minimal  increased FLAIR signal abnormality is identified within the white matter  adjacent to the right frontal horn. Suboptimal evaluation of the optic nerves due to patient motion artifact  without evidence of gross demyelination. MRI CERVICAL SPINE  Severalof demyelination are suspected within cervical cord located at the  cervicomedullary junction, at C2, from C3 through C5, and at T1. Mild central canal stenosis C4-C5, C5-C6, and C6-C7. Bilateral multilevel neural foraminal stenosis as noted above           PHYSICAL EXAM:  Cranial Nerves  II. Visual fields full to confrontation bilaterally. III, IV, VI: Pupils equally round and reactive to light, 3 to 2 mm bilaterally. EOMs: full,               No red desaturation               No kathleen pupil   V. Facial sensation intact to light touch bilaterally  VII: Facial movements symmetric and strong  VIII: Hearing intact to voice  IX,X: Palate elevates symmetrically. 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 female with long hx of MS, previously on Ocrevus presenting with   Okay to discharge after 2 more doses of solumedrol   Has appt with new neurologist     MEDICATIONS:  LOVENOX 30MG SC QD, SOLUMEDROL 1000MG IV QD, TYLENOL 650MG PO Q6H PRN X 1, ATIVAN 1MG IV PRN X 1     ORDERS:  front wheeled walker  Up with assistance   ADULT DIET;  Regular

## 2023-11-20 NOTE — CARE COORDINATION
Patient presented to ED for exacerbation Multiple sclerosis. Had IV Solumedrol. She has outpt MRI scheduled and wants to keep her appointment.   . Her family will provide transportation home

## (undated) DEVICE — FRACTURE TABLE: Brand: MEDLINE INDUSTRIES, INC.

## (undated) DEVICE — DRESSING HYDROFIBER AQUACEL AG ADVANTAGE 3.5X6 IN

## (undated) DEVICE — BIT DRL HLLW CANN PERC 16MM STRL

## (undated) DEVICE — BIT DRL L330MM DIA4.2MM CALIB L100MM ST 3 FLUT QUIK CPL FOR

## (undated) DEVICE — BIT DRL L300MM DIA10MM CANN TAPR L QUIK CPL FOR DH DC TFN

## (undated) DEVICE — GLOVE ORANGE PI 8 1/2   MSG9085

## (undated) DEVICE — GLOVE SURG SZ 85 L12IN FNGR ORTHO 126MIL CRM LTX FREE

## (undated) DEVICE — BIT DRL L330MM DIA4.2MM CALIB 100MM 3 FLUT QUIK CPL

## (undated) DEVICE — SOLUTION IV IRRIG POUR BRL 0.9% SODIUM CHL 2F7124